# Patient Record
Sex: FEMALE | Race: WHITE
[De-identification: names, ages, dates, MRNs, and addresses within clinical notes are randomized per-mention and may not be internally consistent; named-entity substitution may affect disease eponyms.]

---

## 2020-01-10 ENCOUNTER — HOSPITAL ENCOUNTER (EMERGENCY)
Age: 55
Discharge: HOME | DRG: 72 | End: 2020-01-10
Payer: SELF-PAY

## 2020-01-10 DIAGNOSIS — Z79.4: ICD-10-CM

## 2020-01-10 DIAGNOSIS — E11.9: ICD-10-CM

## 2020-01-10 DIAGNOSIS — G93.9: Primary | ICD-10-CM

## 2020-01-10 DIAGNOSIS — I10: ICD-10-CM

## 2020-01-10 LAB
ALBUMIN SERPL-MCNC: 4.2 G/DL (ref 3.2–5)
ALP SERPL-CCNC: 112 U/L (ref 38–126)
ANION GAP SERPL CALCULATED.3IONS-SCNC: 18 MMOL/L
AST SERPL-CCNC: 19 U/L (ref 14–36)
BASOPHILS NFR BLD AUTO: 0 % (ref 0–3)
BUN SERPL-MCNC: 13 MG/DL (ref 7–17)
BUN/CREAT SERPL: 26
CHLORIDE SERPL-SCNC: 97 MMOL/L (ref 95–108)
CO2 SERPL-SCNC: 25 MMOL/L (ref 22–30)
CREAT SERPL-MCNC: 0.5 MG/DL (ref 0.5–1)
EOSINOPHIL NFR BLD AUTO: 1 % (ref 0–8)
ERYTHROCYTE [DISTWIDTH] IN BLOOD BY AUTOMATED COUNT: 12.4 % (ref 11.5–15.5)
HCT VFR BLD AUTO: 49.5 % (ref 37–47)
HGB BLD-MCNC: 16.4 G/DL (ref 12–16)
IMM GRANULOCYTES NFR BLD: 0.3 % (ref 0–5)
INR PPP: 0.9 RATIO (ref 0.7–1.3)
LYMPHOCYTES NFR BLD: 25 % (ref 15–41)
MCH RBC QN AUTO: 27.9 PG  CALC (ref 26–32)
MCHC RBC AUTO-ENTMCNC: 33.1 G/L CALC (ref 32–36)
MCV RBC AUTO: 84.2 FL  CALC (ref 80–100)
MONOCYTES NFR BLD AUTO: 5 % (ref 2–13)
NEUTROPHILS # BLD AUTO: 4.96 THOU/UL (ref 2–7.15)
NEUTROPHILS NFR BLD AUTO: 69 % (ref 42–76)
PLATELET # BLD AUTO: 341 THOU/UL (ref 130–400)
POTASSIUM SERPL-SCNC: 4.3 MMOL/L (ref 3.5–5.1)
PROT SERPL-MCNC: 8 G/DL (ref 6.3–8.2)
PROTHROMBIN TIME: 9.7 SECONDS (ref 9–12.5)
RBC # BLD AUTO: 5.88 MILL/UL (ref 4.2–5.6)
SODIUM SERPL-SCNC: 135 MMOL/L (ref 137–146)

## 2020-06-14 ENCOUNTER — HOSPITAL ENCOUNTER (EMERGENCY)
Dept: HOSPITAL 82 - ED | Age: 55
Discharge: NURSING, REHAB OR LONG-TERM CARE | End: 2020-06-14
Payer: MEDICAID

## 2020-06-14 VITALS — DIASTOLIC BLOOD PRESSURE: 92 MMHG | SYSTOLIC BLOOD PRESSURE: 175 MMHG

## 2020-06-14 VITALS — WEIGHT: 262.35 LBS | HEIGHT: 62 IN | BODY MASS INDEX: 48.28 KG/M2

## 2020-06-14 DIAGNOSIS — D49.6: ICD-10-CM

## 2020-06-14 DIAGNOSIS — Z11.59: ICD-10-CM

## 2020-06-14 DIAGNOSIS — R11.10: Primary | ICD-10-CM

## 2020-06-14 DIAGNOSIS — I10: ICD-10-CM

## 2020-06-14 DIAGNOSIS — E11.65: ICD-10-CM

## 2020-06-14 DIAGNOSIS — Z79.4: ICD-10-CM

## 2020-06-14 LAB
ALBUMIN SERPL-MCNC: 3.4 G/DL (ref 3.2–5)
ALP SERPL-CCNC: 89 U/L (ref 38–126)
AMYLASE SERPL-CCNC: 94 U/L (ref 30–110)
ANION GAP SERPL CALCULATED.3IONS-SCNC: 10 MMOL/L
AST SERPL-CCNC: 30 U/L (ref 14–36)
BASOPHILS NFR BLD AUTO: 0 % (ref 0–3)
BILIRUB UR QL STRIP.AUTO: NEGATIVE
BUN SERPL-MCNC: 22 MG/DL (ref 7–17)
BUN/CREAT SERPL: 48
CHLORIDE SERPL-SCNC: 97 MMOL/L (ref 95–108)
CO2 SERPL-SCNC: 31 MMOL/L (ref 22–30)
COLOR UR AUTO: YELLOW
CREAT SERPL-MCNC: 0.5 MG/DL (ref 0.5–1)
EOSINOPHIL NFR BLD AUTO: 0 % (ref 0–8)
ERYTHROCYTE [DISTWIDTH] IN BLOOD BY AUTOMATED COUNT: 12.8 % (ref 11.5–15.5)
GLUCOSE UR STRIP.AUTO-MCNC: >=1000 MG/DL
HCT VFR BLD AUTO: 41.8 % (ref 37–47)
HGB BLD-MCNC: 13.8 G/DL (ref 12–16)
HGB UR QL STRIP.AUTO: NEGATIVE
IMM GRANULOCYTES NFR BLD: 0.5 % (ref 0–5)
KETONES UR STRIP.AUTO-MCNC: 40 MG/DL
LEUKOCYTE ESTERASE UR QL STRIP.AUTO: NEGATIVE
LIPASE SERPL-CCNC: 973 U/L (ref 23–300)
LYMPHOCYTES NFR BLD: 8 % (ref 15–41)
MCH RBC QN AUTO: 28.7 PG  CALC (ref 26–32)
MCHC RBC AUTO-ENTMCNC: 33 G/DL CAL (ref 32–36)
MCV RBC AUTO: 86.9 FL  CALC (ref 80–100)
MONOCYTES NFR BLD AUTO: 5 % (ref 2–13)
MYOGLOBIN SERPL-MCNC: 44 NG/ML (ref 0–62)
NEUTROPHILS # BLD AUTO: 11.96 THOU/UL (ref 2–7.15)
NEUTROPHILS NFR BLD AUTO: 87 % (ref 42–76)
NITRITE UR QL STRIP.AUTO: NEGATIVE
PH UR STRIP.AUTO: 6 [PH] (ref 4.5–8)
PLATELET # BLD AUTO: 248 THOU/UL (ref 130–400)
POTASSIUM SERPL-SCNC: 3.5 MMOL/L (ref 3.5–5.1)
PROT SERPL-MCNC: 6.6 G/DL (ref 6.3–8.2)
PROT UR QL STRIP.AUTO: NEGATIVE MG/DL
RBC # BLD AUTO: 4.81 MILL/UL (ref 4.2–5.6)
SODIUM SERPL-SCNC: 134 MMOL/L (ref 137–146)
SP GR UR STRIP.AUTO: 1.02
UROBILINOGEN UR QL STRIP.AUTO: 2 E.U./DL

## 2020-06-25 ENCOUNTER — HOSPITAL ENCOUNTER (INPATIENT)
Dept: HOSPITAL 82 - ED | Age: 55
LOS: 14 days | Discharge: NURSING, REHAB OR LONG-TERM CARE | DRG: 55 | End: 2020-07-09
Attending: INTERNAL MEDICINE | Admitting: INTERNAL MEDICINE
Payer: MEDICAID

## 2020-06-25 VITALS — SYSTOLIC BLOOD PRESSURE: 97 MMHG | DIASTOLIC BLOOD PRESSURE: 62 MMHG

## 2020-06-25 VITALS — HEIGHT: 62 IN | WEIGHT: 249.12 LBS | BODY MASS INDEX: 45.84 KG/M2

## 2020-06-25 VITALS — DIASTOLIC BLOOD PRESSURE: 78 MMHG | SYSTOLIC BLOOD PRESSURE: 135 MMHG

## 2020-06-25 DIAGNOSIS — Z98.2: ICD-10-CM

## 2020-06-25 DIAGNOSIS — N39.0: ICD-10-CM

## 2020-06-25 DIAGNOSIS — Z23: ICD-10-CM

## 2020-06-25 DIAGNOSIS — R53.1: ICD-10-CM

## 2020-06-25 DIAGNOSIS — R29.810: ICD-10-CM

## 2020-06-25 DIAGNOSIS — F32.9: ICD-10-CM

## 2020-06-25 DIAGNOSIS — E11.65: ICD-10-CM

## 2020-06-25 DIAGNOSIS — C71.6: Primary | ICD-10-CM

## 2020-06-25 DIAGNOSIS — F41.9: ICD-10-CM

## 2020-06-25 DIAGNOSIS — I10: ICD-10-CM

## 2020-06-25 DIAGNOSIS — B96.20: ICD-10-CM

## 2020-06-25 DIAGNOSIS — Z79.4: ICD-10-CM

## 2020-06-25 DIAGNOSIS — Z20.828: ICD-10-CM

## 2020-06-25 DIAGNOSIS — R62.7: ICD-10-CM

## 2020-06-25 LAB
ALBUMIN SERPL-MCNC: 3.6 G/DL (ref 3.2–5)
ALP SERPL-CCNC: 80 U/L (ref 38–126)
ANION GAP SERPL CALCULATED.3IONS-SCNC: 9 MMOL/L
AST SERPL-CCNC: 14 U/L (ref 14–36)
BASOPHILS NFR BLD AUTO: 0 % (ref 0–3)
BUN SERPL-MCNC: 13 MG/DL (ref 7–17)
BUN/CREAT SERPL: 19
CHLORIDE SERPL-SCNC: 100 MMOL/L (ref 95–108)
CO2 SERPL-SCNC: 30 MMOL/L (ref 22–30)
CREAT SERPL-MCNC: 0.7 MG/DL (ref 0.5–1)
CRP SERPL-MCNC: 0.8 MG/DL (ref 0–0.9)
EOSINOPHIL NFR BLD AUTO: 1 % (ref 0–8)
ERYTHROCYTE [DISTWIDTH] IN BLOOD BY AUTOMATED COUNT: 13.2 % (ref 11.5–15.5)
HCT VFR BLD AUTO: 45.3 % (ref 37–47)
HGB BLD-MCNC: 14.7 G/DL (ref 12–16)
IMM GRANULOCYTES NFR BLD: 0.4 % (ref 0–5)
INR PPP: 1 RATIO (ref 0.7–1.3)
LYMPHOCYTES NFR BLD: 14 % (ref 15–41)
MAGNESIUM SERPL-MCNC: 1.9 MG/DL (ref 1.6–2.3)
MCH RBC QN AUTO: 28.2 PG  CALC (ref 26–32)
MCHC RBC AUTO-ENTMCNC: 32.5 G/DL CAL (ref 32–36)
MCV RBC AUTO: 86.8 FL  CALC (ref 80–100)
MONOCYTES NFR BLD AUTO: 6 % (ref 2–13)
NEUTROPHILS # BLD AUTO: 7.73 THOU/UL (ref 2–7.15)
NEUTROPHILS NFR BLD AUTO: 79 % (ref 42–76)
PLATELET # BLD AUTO: 311 THOU/UL (ref 130–400)
POTASSIUM SERPL-SCNC: 3.9 MMOL/L (ref 3.5–5.1)
PROT SERPL-MCNC: 6.6 G/DL (ref 6.3–8.2)
PROTHROMBIN TIME: 10.1 SECONDS (ref 9–12.5)
RBC # BLD AUTO: 5.22 MILL/UL (ref 4.2–5.6)
SODIUM SERPL-SCNC: 135 MMOL/L (ref 137–146)
TSH SERPL DL<=0.05 MIU/L-ACNC: 1.04 UIU/ML (ref 0.47–4.68)

## 2020-06-25 NOTE — NUR
PT STATES SHE HAS A HX OF DYSPHAGIA AND USES PUREED DIET AND HONEY THICKENED
LIQUIDS. VSS. RESP EVEN AND UNLABORED. CALLED PTS SON AT HER REQUEST TO INFORM
OF ADMIT AND REQUEST OF PT TO BRING HER READING GLASSES

## 2020-06-25 NOTE — NUR
PT ARRIVED TO [facility] VIA STRETCHER ACCOMPANIED BY ER NURSE, DINNER PROVIDED.
MECHANICAL SOFT AND THICKENED LIQUIDS, PT FED SELF TOLERATED WELL. PT HAS A
FLAT AFFECT, ALERT AND ORIENTED X3, NO EDEMA. ORIENTED PT TO ROOM AND CALL
LIGHT, DISCUSSED POC, PT STATES SHE HAD A BM, PT GIVEN AND PARTIAL BED BATH
AND NEW LINENS AND GOWN PROVIDED, NOTED MULTIPLE BRUISES TO BUE, PT STATES
FROM LAST HOSPITAL STAY. NOTED STAPLES TO R SIDE OF HEAD, PT HAS AV SHUNT.
STAPLES TO CHEST. L FACIAL DROOP. PT INCONTINENT, PUREWICK PLACED. NOTED SCAR
TO R FUNES, PHOTO OBTAINED PT STATES A SPIDER BITE FROM 2008, NO OPEN AREA.
NOTED REDNESS AND SCALING TO ELBOWS BILAT, PT STATES FROM PSORIASIS. ADMISSION
ASSESSMENT COMPLETED. CALL LIGHT IN REACH,CONTINUE TO MONITOR.

## 2020-06-25 NOTE — NUR
NURSE TO NURSE REPORT CALLED TO ZARINA ACUÑA [facility]. ZARINA LPN NOTIFIED PT
PUREED DIET WITH HONEY THICKENED LIQUIDS.

## 2020-06-25 NOTE — NUR
PT RESTING IN BED, STATES SHE DOES NOT WANT TO TAKE ANY PILLS AT THIS TIME.
CALL LIGHT IN REACH,CONTINUE TO MONITOR.

## 2020-06-25 NOTE — NUR
FULL BEDBATH GIVEN. PT INCONTINENT OF LOOSE YELLOW STOOLS AND URINE. PURE WICK
PLACED. VSS. PT IN NO DISTRESS

## 2020-06-25 NOTE — NUR
PT RESTING IN BED, VOICES NO COMPLAINTS OR NEEDS AT THIS TIME. PT ADVISED OF
WAIT TIME WHILE WAITING FOR ADMISSION. PT VERBALIZED UNDERSTANDING.

## 2020-06-25 NOTE — NUR
PT INCONTINENT OF URINE, MARK CARE AND LINEN CHANGE RENDERED. WICK WAS IN PLACE
BUT DID NOT CATCH URINE. VSS. PT UPDATED ON WAIT TIME FOR ROOM

## 2020-06-25 NOTE — NUR
PT TO ROOM VIA EMS STRETCHER , PT IN NO DISTRESS. VSS. PT NOTED TO HAVE LT ARM
ATAXIA, LT FACIAL DROOP, LT VISUAL FIELD DEFICIT. PT STATES THESE HAVE BEEN
PROGRESSIVE EFFECTS OF BRAIN TUMOR OVER LAST 4 MONTHS. PT IS ALERT AND ORIENTED
X4 AND HAS SLIGHTLY THICKENED SPEECH. PT HAS NO DRIFT OF UPPER OR LOWER
EXTREMITIES BUT STATES PROGRESSIVE 
WEAKNESS TO LT UPPER AND LOWER EXTREMITY FOR MONTHS

## 2020-06-26 VITALS — DIASTOLIC BLOOD PRESSURE: 75 MMHG | SYSTOLIC BLOOD PRESSURE: 134 MMHG

## 2020-06-26 VITALS — DIASTOLIC BLOOD PRESSURE: 76 MMHG | SYSTOLIC BLOOD PRESSURE: 128 MMHG

## 2020-06-26 VITALS — DIASTOLIC BLOOD PRESSURE: 88 MMHG | SYSTOLIC BLOOD PRESSURE: 148 MMHG

## 2020-06-26 VITALS — SYSTOLIC BLOOD PRESSURE: 122 MMHG | DIASTOLIC BLOOD PRESSURE: 68 MMHG

## 2020-06-26 VITALS — DIASTOLIC BLOOD PRESSURE: 89 MMHG | SYSTOLIC BLOOD PRESSURE: 154 MMHG

## 2020-06-26 VITALS — SYSTOLIC BLOOD PRESSURE: 124 MMHG | DIASTOLIC BLOOD PRESSURE: 72 MMHG

## 2020-06-26 LAB
BACTERIA #/AREA URNS HPF: (no result) HPF
BILIRUB UR QL STRIP.AUTO: NEGATIVE
COLOR UR AUTO: YELLOW
EPI CELLS URNS QL MICRO: (no result) EPI/HPF
GLUCOSE UR STRIP.AUTO-MCNC: 250 MG/DL
HGB UR QL STRIP.AUTO: (no result)
LEUKOCYTE ESTERASE UR QL STRIP.AUTO: (no result)
NITRITE UR QL STRIP.AUTO: POSITIVE
PH UR STRIP.AUTO: 7 [PH] (ref 4.5–8)
PROT UR QL STRIP.AUTO: (no result) MG/DL
RBC #/AREA URNS HPF: (no result) RBC/HPF (ref 0–5)
SP GR UR STRIP.AUTO: 1.02
UROBILINOGEN UR QL STRIP.AUTO: 1 E.U./DL
WBC #/AREA URNS HPF: (no result) WBC/HPF (ref 0–5)
YEAST URNS QL MICRO: (no result) HPF

## 2020-06-26 NOTE — NUR
VITALS OBTAINED, PT VOICES NO NEEDS OR COMPLAINTS AT THIS TIME, CALL LIGHT IN
REACH,CONTINUE TO MONITOR.

## 2020-06-26 NOTE — NUR
ASSESSMENT IS COMPLETED: IV SITE IS FREE FROM REDNESS OR EDEMA. HR IS
REG,PULSES ARE STRONG X4, ABD IS SOFT WITH ACTIVE BS. BREATH SOUNDS ARE CLEAR
BILATERALLY, NO C/O SOB. TELE MONITOR IN PLACE. CONTINUE TO OBSERVE AND
MONITOR.

## 2020-06-26 NOTE — NUR
PT RESTING IN BED, PT BOOSTED AND REPOSTIONED PER REQUEST. PT ALERT AND
ORIENTED. RESPIRATIONS EVEN AND UNLABORED, LUNGS SOUND CLEAR. PEDAL PULSES ARE
STRONG. PT DENIES ANY PAIN OR DISCOMFORT AT THIS TIME. SAFETY PRECAUTIONS IN
PLACE. WILL CONTINUE TO MONITOR.

## 2020-06-27 VITALS — DIASTOLIC BLOOD PRESSURE: 85 MMHG | SYSTOLIC BLOOD PRESSURE: 158 MMHG

## 2020-06-27 VITALS — SYSTOLIC BLOOD PRESSURE: 148 MMHG | DIASTOLIC BLOOD PRESSURE: 68 MMHG

## 2020-06-27 VITALS — SYSTOLIC BLOOD PRESSURE: 172 MMHG | DIASTOLIC BLOOD PRESSURE: 86 MMHG

## 2020-06-27 VITALS — SYSTOLIC BLOOD PRESSURE: 146 MMHG | DIASTOLIC BLOOD PRESSURE: 57 MMHG

## 2020-06-27 VITALS — DIASTOLIC BLOOD PRESSURE: 89 MMHG | SYSTOLIC BLOOD PRESSURE: 167 MMHG

## 2020-06-27 PROCEDURE — 3E0234Z INTRODUCTION OF SERUM, TOXOID AND VACCINE INTO MUSCLE, PERCUTANEOUS APPROACH: ICD-10-PCS | Performed by: INTERNAL MEDICINE

## 2020-06-27 NOTE — NUR
PT RESTING IN BED EATING LUNCH, NO SIGNS OF DISTRESS NOTED, RESP EVEN AND
UNLABORED. PT ALERT AND ORIENTED X3, DISCUSSED POC, PT VOICES NO NEEDS OR
COMPLAINTS AT THIS TIME, PT HAS STAPLES TO R HEAD FROM AV SHUNT PLACED IN
MIRNA, STAPLES TO CHEST INTACT, ASSESSMENT COMPLETED, CALL LIGHT IN
REACH,CONTINUE TO MONITOR.

## 2020-06-27 NOTE — NUR
PT RESTING IN BED, NO SIGNS OF DISTRESS NOTED, RESP EVEN AND UNLABORED. PT
ALERT AND ORIENTED X3, DISCUSSED POC, PT IS INCONTINENT OF BOWEL AND BLADDER.
OFFERED PUREWICK PT DECLINED. ASSESSMENT COMPLETED, CALL LIGHT IN
REACH,CONTINUE TO MONITOR.

## 2020-06-27 NOTE — NUR
PT RESTING IN BED, NO SIGNS OF DISTRESS NOTED, RESP EVEN AND UNLABORED. VOICES
NO NEEDS OR COMPLAINTS AT THIS TIME. CALL LIGHT IN REACH,CONTINUE TO MONITOR.

## 2020-06-27 NOTE — NUR
PT RESTING IN BED ALERT AND ORIENTED. RESPIRATIONS EVEN AND UNLABORED ON RA.
LUNGS SOUND DIMINISHED. PEDAL PULSES ARE STRONG. PT REPOSITIONS PER REQUEST,
BARRIOR CREME ALPIED TO BOTTOM. SAFETY PRECAUTIONS IN PLACE. WILL CONTINUE TO
MONITOR.

## 2020-06-28 VITALS — DIASTOLIC BLOOD PRESSURE: 78 MMHG | SYSTOLIC BLOOD PRESSURE: 157 MMHG

## 2020-06-28 VITALS — DIASTOLIC BLOOD PRESSURE: 85 MMHG | SYSTOLIC BLOOD PRESSURE: 155 MMHG

## 2020-06-28 VITALS — SYSTOLIC BLOOD PRESSURE: 141 MMHG | DIASTOLIC BLOOD PRESSURE: 80 MMHG

## 2020-06-28 VITALS — DIASTOLIC BLOOD PRESSURE: 85 MMHG | SYSTOLIC BLOOD PRESSURE: 157 MMHG

## 2020-06-28 VITALS — SYSTOLIC BLOOD PRESSURE: 170 MMHG | DIASTOLIC BLOOD PRESSURE: 80 MMHG

## 2020-06-28 VITALS — DIASTOLIC BLOOD PRESSURE: 79 MMHG | SYSTOLIC BLOOD PRESSURE: 134 MMHG

## 2020-06-28 LAB
ALBUMIN SERPL-MCNC: 3.1 G/DL (ref 3.2–5)
ALP SERPL-CCNC: 67 U/L (ref 38–126)
ANION GAP SERPL CALCULATED.3IONS-SCNC: 10 MMOL/L
AST SERPL-CCNC: 13 U/L (ref 14–36)
BASOPHILS NFR BLD AUTO: 0 % (ref 0–3)
BUN SERPL-MCNC: 14 MG/DL (ref 7–17)
BUN/CREAT SERPL: 28
CHLORIDE SERPL-SCNC: 106 MMOL/L (ref 95–108)
CO2 SERPL-SCNC: 23 MMOL/L (ref 22–30)
CREAT SERPL-MCNC: 0.5 MG/DL (ref 0.5–1)
EOSINOPHIL NFR BLD AUTO: 0 % (ref 0–8)
ERYTHROCYTE [DISTWIDTH] IN BLOOD BY AUTOMATED COUNT: 13 % (ref 11.5–15.5)
HCT VFR BLD AUTO: 40.6 % (ref 37–47)
HGB BLD-MCNC: 13.2 G/DL (ref 12–16)
IMM GRANULOCYTES NFR BLD: 0.5 % (ref 0–5)
LYMPHOCYTES NFR BLD: 13 % (ref 15–41)
MCH RBC QN AUTO: 28 PG  CALC (ref 26–32)
MCHC RBC AUTO-ENTMCNC: 32.5 G/DL CAL (ref 32–36)
MCV RBC AUTO: 86.2 FL  CALC (ref 80–100)
MONOCYTES NFR BLD AUTO: 4 % (ref 2–13)
NEUTROPHILS # BLD AUTO: 7.86 THOU/UL (ref 2–7.15)
NEUTROPHILS NFR BLD AUTO: 82 % (ref 42–76)
PLATELET # BLD AUTO: 262 THOU/UL (ref 130–400)
POTASSIUM SERPL-SCNC: 4.3 MMOL/L (ref 3.5–5.1)
PROT SERPL-MCNC: 6 G/DL (ref 6.3–8.2)
RBC # BLD AUTO: 4.71 MILL/UL (ref 4.2–5.6)
SODIUM SERPL-SCNC: 135 MMOL/L (ref 137–146)

## 2020-06-28 NOTE — NUR
PT RESTING IN BED, NO SIGNS OF DISTRESS NOTED, RESP EVEN AND UNLABORED, PT
ALERT AND ORIENTED X3, DISCUSSED POC, PT HAS A FLAT AFFECT, VOICES NO NEEDS OR
COMPLAINTS AT THIS TIME. ASSESSMENT COMPLETED, CALL LIGHT IN REACH,CONTINUE TO
MONITOR.

## 2020-06-28 NOTE — NUR
SPOKE TO YANIV FROM [facility] REGARDING PT, TRANSFERRED CALL TO PT. CALL LIGHT
IN REACH,CONTINUE TO MONITOR.

## 2020-06-28 NOTE — NUR
PT RESTING IN BED, NO SIGNS OF DISTRESS NOTED, RESP EVEN AND UNLABORED, PT
VOICES NO NEEDS OR COMPLAINTS AT THIS TIME, CONTINUE TO MONITOR.

## 2020-06-28 NOTE — NUR
PT RESTING IN BED, REQUESTING PUDDING, NO SIGNS OF DISTRESS NOTED, RESP EVEN
AND UNLABORED. CALL LIGHT IN REACH,CONTINUE TO MONITOR.

## 2020-06-28 NOTE — NUR
PT RESTING IN BED ALERT AND ORIENTED. RESPIRATIONS EVEN AND UNLABORED ON RA,
LUNGS SOUND DIMINISHED. PEDAL PULSES STROONG. SAFETY PRECAUTIONS IN PLACE.
WILL CONTINUE TO MONITOR.

## 2020-06-28 NOTE — NUR
PT BP ELEVATED INFORMED MD, ORDERS TO START NEW MEDICATION DAILY AND START
NOW. DISCUSSED WITH PT, VERBALIZED UNDERSTANDING. CALL LIGHT IN REACH,CONTINUE
TO MONITOR.

## 2020-06-28 NOTE — NUR
PT RESTING IN BED. NO S/S OF DISTRESS AT THIS TIME. RESPIRATIONS EVEN AND
UNLABORED. SAFETY PRECAUTIONS IN PLACE. WILL CONTINUE TO MONITOR.

## 2020-06-29 VITALS — DIASTOLIC BLOOD PRESSURE: 80 MMHG | SYSTOLIC BLOOD PRESSURE: 150 MMHG

## 2020-06-29 VITALS — DIASTOLIC BLOOD PRESSURE: 85 MMHG | SYSTOLIC BLOOD PRESSURE: 157 MMHG

## 2020-06-29 VITALS — SYSTOLIC BLOOD PRESSURE: 91 MMHG | DIASTOLIC BLOOD PRESSURE: 63 MMHG

## 2020-06-29 VITALS — SYSTOLIC BLOOD PRESSURE: 168 MMHG | DIASTOLIC BLOOD PRESSURE: 85 MMHG

## 2020-06-29 VITALS — DIASTOLIC BLOOD PRESSURE: 76 MMHG | SYSTOLIC BLOOD PRESSURE: 150 MMHG

## 2020-06-29 VITALS — SYSTOLIC BLOOD PRESSURE: 161 MMHG | DIASTOLIC BLOOD PRESSURE: 78 MMHG

## 2020-06-29 NOTE — NUR
PT INCONTINENT OF URINE, PT CLEANED UP, AND SOILED LINENS CHANGED. SAFETY
PRECAUTIONS IN PLACE. WILL CONTINUE TO MONITOR.

## 2020-06-29 NOTE — NUR
RECEIVED REPORT FROM NURSE DENG PATIENT RESTING IN BED, BREATHING EVEN AND
UNLABORED, CALL LIGHT AT REACH.

## 2020-06-29 NOTE — NUR
RECIEVED REPORT FROM ELISA WILLIAM. PT RESTING IN SEMI FOWLERS POSITION UPON
ENTERING ROOM. INTRODUCED SELF TO PT AND DISCUSSED POC. RESPIRATIONS ARE EVEN
AND UNLABORED WITH NO SIGNS OF DRISTRESS. PT DENIES ANY PAIN OR DISCOMFORTS AT
THIS TIME. ALL SAFTEY PRECAUTIONS IN PLACE WITH CALLL LIGHT IN REACH .WILL
COTNINUE TO MONITOR

## 2020-06-29 NOTE — NUR
ASSESSMENT AND VITALS COMPLETED AT THIS TIME. /76, HR 63, O2 97% ON ROOM
AIR. RESPIRATIONS ARE EVEN AND UNLABORED WITH NO SIGNS OF DISTRESS. LUNG
SOUNDS ARE CLEAR. HEART RHYTHM IS NORMAL, TELE IN PLACE. BOWEL SOUNDS ARE HYPO
ACTIVE. LAST REPORTED BM 06/25/2020. MIRLAX ADMINISTERED WITH MORNING
MEDICATIONS. RADIAL AND PEDAL PULSES ARE STRONG WITH NORMAL CAPILLARY REFILL.
SKIN IS WARM AND DRY, PRESENT WITH MULTIPLE SMALL BRUISES. PT STATES ITS FROM
"BEING POKED WITH THE NEEDLES." IV FLUIDS RUNNING  ML AS ORDERED. SITE
APPEARS HEALTHY AND PATENT.PT DENIES ANY PAIN OR DISCOMFORTS AT THIS TIME. ALL
SAFTEY PRECAUTIONS IN PLACE WITH CALL LIGHT IN REACH. WILL CONTINUE TO
MONITOR.

## 2020-06-29 NOTE — NUR
PT RESTING IN SEMI FOWLERS POSITON WATCHING TV. RESPIRATIONS ARE EVEN AND
UNLABORED WITH NO SIGNS OF DISTRESS.PT DENIES ANY PAIN OR ADDITIONAL NEEDS AT
THIS TIME.  ALL SAFETY PRECAUTIONS IN PLACE WITH CALL LIGHT IN REACH. WILL
CONITNUE TO MONITOR

## 2020-06-29 NOTE — NUR
PATIENT ALERT ORINETED ABLE TO MAKE NEEDS KNONW, C/O OF PAIN OCCIPITAL PART OF
THE HEAD, WILL MEDICATE.

## 2020-06-29 NOTE — NUR
PT RESTING IN BED, RESPIRATIONS EVEN AND UNLABORED ON RA. TELE IN PLACE. NO
S/S OF DISTRESS AT THIS TIME. SAFTY PRECAUTIONS IN PLACE. WILL CONTINUE TO
MONITOR.

## 2020-06-29 NOTE — NUR
PT RESTING IN SEMI FOWELERS POSITION WATCHING TV. RESPIRATIONS ARE EVEN AND
UNLABORED WITH NO SIGNS OF DISTRESS. PT DENIES ANY PAIN OR DISCOMFORTS AT THIS
TIME WITH CALL LIGTH IN REACH. WILL CONTINUE TO MONITOR

## 2020-06-30 VITALS — SYSTOLIC BLOOD PRESSURE: 173 MMHG | DIASTOLIC BLOOD PRESSURE: 85 MMHG

## 2020-06-30 VITALS — SYSTOLIC BLOOD PRESSURE: 158 MMHG | DIASTOLIC BLOOD PRESSURE: 87 MMHG

## 2020-06-30 VITALS — SYSTOLIC BLOOD PRESSURE: 156 MMHG | DIASTOLIC BLOOD PRESSURE: 85 MMHG

## 2020-06-30 VITALS — DIASTOLIC BLOOD PRESSURE: 84 MMHG | SYSTOLIC BLOOD PRESSURE: 165 MMHG

## 2020-06-30 VITALS — DIASTOLIC BLOOD PRESSURE: 83 MMHG | SYSTOLIC BLOOD PRESSURE: 141 MMHG

## 2020-06-30 VITALS — DIASTOLIC BLOOD PRESSURE: 85 MMHG | SYSTOLIC BLOOD PRESSURE: 166 MMHG

## 2020-06-30 VITALS — DIASTOLIC BLOOD PRESSURE: 84 MMHG | SYSTOLIC BLOOD PRESSURE: 150 MMHG

## 2020-06-30 VITALS — SYSTOLIC BLOOD PRESSURE: 139 MMHG | DIASTOLIC BLOOD PRESSURE: 81 MMHG

## 2020-06-30 VITALS — DIASTOLIC BLOOD PRESSURE: 79 MMHG | SYSTOLIC BLOOD PRESSURE: 146 MMHG

## 2020-06-30 NOTE — NUR
RECEIVED REPORT FROM NURSE DENG PATIENT RESTING IN BEED WATCHING TV,
BREATHING EVEN AND UNLABORED CALL LIGHT AT REACH.

## 2020-06-30 NOTE — NUR
PT note
Entered pt. room as she was in semi-fowlers position.  Pt. had agreed to
participate in physical therapy. Began sesssion w/ heel slides, ankle pumps
for improved blood flow.  Supine>sit (independent), sit>stand (Min A), VC for
correct hand placement and for proper form to ascend to a standing position.
Staic standing w/ light weight shifting ant/post, laterally.  Pt stated
dizziness was not to bad as she was standing.  Stand>sit (Independent),
sit>supine (Mod A) w/ pt. LE.  bed mobility as pt scooted towards head of bed
(Min A).  Pt. in semi-fowlers position w/ tray table and call bell by bedside.
 [facility] 6 score 14

## 2020-06-30 NOTE — NUR
PT RESTING IN SEMI FOWLERS POSITION WACTHING TV. RESPIRATIONS ARE EVEN AND
UNLABORED. PT DENIES ANY PAIN OR DISCOMFORTS. ALL SAFTEY PRECAUTIONS IN PLACE
WITH CALL LIGHT IN REACH. WILL CONTINUE TO MONITOR

## 2020-06-30 NOTE — NUR
RECIEVED REPORT FROM ELISA SILVA. PT RESTING IN SEMI FOWLERS POSITION UPON
ENTERING ROOM. INTRODUCED SELF TO PT AND DISCUSSED POC.RESPIRATIONS ARE EVEN
AND UNLABORED WITH NO SIGNS OF DISTRESS. PT DENIES ANY PAIN OR DISCOMFORTS AT
THIS TIME. ALL SAFETY PRECAUTIONS IN PLACE WITH ALL LIGHT IN REACH. WILL
CONTINUE TO MONTIOR

## 2020-06-30 NOTE — NUR
PATIENT ALERT ORIENTED ABLE TO MAKE NEEDS KNOWN, WITH ONGOING IV NS @ 100CC/HR
INFUSING WELL ON RT WRIST, REMAINS ON TELE SR 68, LBM 6/30, PATIENT
REPOSITIONED FOR COMFORT, BREATHING  EVEN AND UNLABORED CALL LIGHT AT REACH.

## 2020-06-30 NOTE — NUR
PT RESTING IN SEMI FOWLERS POSITION WATCHING TV. RESPIRATIONS ARE EVEN AND
UNLABORED WITH NO DISTRESS.PT DENIES ANY PAIN OR DISCOMFORTS AT THIS TIME. ALL
SAFTEY PRECAUTIONS REMAIN IN PLACE WITH CALL LIGHT IN REACH. WILL CONTINUE TO
MONITOR

## 2020-06-30 NOTE — NUR
SUUPOSITORY ADMINISTERED AT THIS TIME. PT TOLERATED WELL. REASSESSMENT OF BP
RESULTING /79, HR 70, O2 95% ON ROOM AIR. ADDITIONAL DOSE OF 5MG NORVASC
ORDERED AND ADMINISTERED. PT DENIES ANY PAIN OR ADDITIONAL NEEDS AT THIS TIME.
ALL SAFTEY PRECAUTIONS REAMIN IN PLACE WITH CALL LIGHT IN REACH. WILL CONTINUE
TO MONITOR

## 2020-06-30 NOTE — NUR
ASSESSMENT AND VIATLS COMPLETED AT THIS TIME. REASSESSMENT OF BP RESULTING IN
165/84, HR 62, O2 95% ON ROOM AIR. RESPIRATIONS ARE EVEN AND UNLABORED. LUNG
SOUNDS ARE CLEAR. HEART RHYTHM IS NORMAL WITH TELE IN PLACE.BOWEL SOUNDS ARE
ACTIVE IN ALL QUADRANTS, LAST REPORTED BM 06/26/20. PT REFUSED MO LAST NIGHT.
NOTIFED JEANMARIE OF LAST BM, SUPPOSITORY TO BE ADMINISTERED.  RADIAL AND PEDAL
PULSES ARE STRONG WITH NORMAL CAPILLARY REFILL. PT DOES PRESENT WITH SLIGHT
LEFT SIDE WEAKNESS. SKIN IS WARM AND DRY WITH NO BREAKDOWN. IV FLUIDS RUNNING
 ML AS ORDERED. SITE APPEARS HEALTHY AND PATENT. PT BACK OF HEAD IS
SHAVED WITH 5 STAPLES. PT STATES ITS FROM PREVIOUSLY WHEN SHE HAD TO GET IT
DRAINED. PT ALSO PRESENTS WITH STAPLES ON CHEST FROM WHAT PT STATES TO BE FROM
PREVIOUS SHUNT. PT DENIES ANY PAIN OR DISCOMFORTS AT THIS TIME. ALL SAFTEY
PRECAUTIONS IN PLACE WITH CALL LIGHT IN REACH. WILL COTNINUE TO MONITOR

## 2020-07-01 VITALS — SYSTOLIC BLOOD PRESSURE: 134 MMHG | DIASTOLIC BLOOD PRESSURE: 80 MMHG

## 2020-07-01 VITALS — SYSTOLIC BLOOD PRESSURE: 157 MMHG | DIASTOLIC BLOOD PRESSURE: 82 MMHG

## 2020-07-01 VITALS — SYSTOLIC BLOOD PRESSURE: 130 MMHG | DIASTOLIC BLOOD PRESSURE: 85 MMHG

## 2020-07-01 VITALS — DIASTOLIC BLOOD PRESSURE: 89 MMHG | SYSTOLIC BLOOD PRESSURE: 150 MMHG

## 2020-07-01 VITALS — DIASTOLIC BLOOD PRESSURE: 83 MMHG | SYSTOLIC BLOOD PRESSURE: 154 MMHG

## 2020-07-01 VITALS — DIASTOLIC BLOOD PRESSURE: 74 MMHG | SYSTOLIC BLOOD PRESSURE: 126 MMHG

## 2020-07-01 NOTE — NUR
RECIEVED REPORT FROM ELISA SILVA. PT RESTING IN LOW FOWLERS POSITION WATCHING
TV. PT REQUESTED TO BE PULLED UP IN BED, ASSISTED BY HERSON HAMMOND. RESPIRATIONS
ARE EVEN AND UNLABORED WITH NO SIGNS OF DISTRESS. PT DENIES ANY PAIN OR
DISCOMFORTS. ALL SAFETY PRECAUTIONS IN PLACE WITH CALL LIGHT IN REACH.WILL
CONTINUE TO MONITOR.

## 2020-07-01 NOTE — NUR
PT WAS SEEN FOR THERA. DYNAMIC ACTIVITIES. SHE WAS SUPINE IN BED, AGREED TO
WORK WITH P.T. SUPINE TO SIT WITH CGA. PT REPORTS SUDDEN ONSET SEVERE
DIZZINESS AS SHE SAT UP. SHE WAS GIVEN ADEQUATE TIME TO STAY IN SITTING
POSITION UNTIL DIZZINESS SUBSIDES. SHE THEN STOOD UP WITH MIN A AND WALKER,
AGAIN REPORTED DIZZINESS, WAITED UNTIL DIZZINESS SUBSIDED. PT TOOK 3 STEPS AND
A PIVOT TURN WITH RW AND MOD A TO TRANSFER AND SIT IN THE BEDSIDE RECLINER.
SHE WAS GIVEN VERBAL CUES TO SIT IN THE RECLINER, LEG REST ELEVATED, CALL BELL
AND PHONE BESIDE HER. PT REPORTED FEELING BETTER BUT STILL DIZZY. SHE WAS
INSTRUCTED AND PERFORMED ANKLE ROM EX AND LAQ ISOMETRICS. LEFT PT W/O ADVERSE
RXNS NOTED OR REPORTED AT THE END OF TX.
AMPAC: 11 POINTS

## 2020-07-01 NOTE — NUR
REPORT FROM SOWMYA ACUÑA. PT NOTED SITTING UP IN BED. ALERT AND ORIENTED. NO
APAPRENT DISTRESS NOTED. IV SITE APPEARS HEALTHY WITH IVF INFUSING. PT DENIES
ANY PAIN OR DISCOMFORT. PURWIK IN PLACE. DISCUSSED POC. PT VERBALIZED
UNDERSTANDING.  NO CURRENT WANTS OR NEEDS. CALL LIGHT WITHIN REACH. WILL
CONTINUE TO MONITOR.

## 2020-07-01 NOTE — NUR
PT RESTING IN BED WITH EYES CLOSED. NO APPARENT DISTRESS NOTED. VSS. PURWIK IN
PLACE. CALL LIGHT WITHIN REACH. WILL CONTINUE TO MONITOR.

## 2020-07-01 NOTE — NUR
ASSESSMENT AND VITALS COMPLETED AT THIS TIME. PT IS A/O X3 AND INCONTINENT .
/89, HR 72, O2 97% ON ROOM AIR. RESPIRATIONS ARE EVEN AND UNLABORED WITH
NO SIGNS OF DISTRSS. LUNG SOUNDS ARE CLEAR. HEART RHYTHM IS NORMAL WITH TELE
IN PLACE.  BOWEL SOUNDS ARE ACTIVE IN ALL QUADRANTS WITH NO TENDERNESS. LAST
REPORTED BM 06/30/20. RADIAL AND PEDAL PULSES ARE STRONG WITH NORMAL CAPILLARY
REFILL. IV #22 IN RW RUNNING @100 ML NS AS ORDERED, SITE APPEARS HEALTHY AND
PATENT. SKIN IS WARM AND DRY. PT APPEARS TO BE SLIGHTLY FLSUHED IN THE FACE,
AIR TURNED DOWN AND PT REQUESTED A FAN.  SKIN APPEARS TO HAVE MUTIPLE SMALL
BRUISES. PT PRESENT WITH LARGE BRUISED AREA ON RIGHT CALF, PT STATES ITS FROM
"A SPIDER BITE" PRIOR TO COMING TO [facility]. PT PRESENTS WITH STABLES IN HEAD (5
RIGHT SIDE, 18 LEFT SIDE) CHEST(2) AND RIGHT UPPER ABD(14) FROM WHAT PT STATES
IS FROM "SHUNT AT [employer]". MORNING MEDS ADMINISTERED WITH NO DIFFICULTY. PT
DENIES ANY PAINS OR NEEDS AT THIS TIME. ALL SAFTEY PRECAUTIONS IN PLACE WITH
CALL LIGHT IN REACH. WILL CONTINUE TO MONITOR

## 2020-07-01 NOTE — NUR
PT SITTING IN RECYLINER WATCHING TV. RESPIRATIONS ARE EVEN AND UNLABORED WITH
NO SIGNS OF DISTRESS. PT DENIES ANY PAIN OR DISCOMFORTS AT THIS TIME. ALL
SAFETY PRECAUTIONS REMAIN IN PLACE WITH CALL LIGHT IN REACH. WILL CONTINUE TO
MONITOR

## 2020-07-01 NOTE — NUR
PT REQUESTED TO BE ASSISTED BACK INTO BED AT THIS TIME. PT ASSISTED BACK TO
BED WITH BOTH ANA AND HERSON HAMMOND. RESPIRATIONS ARE EVEN AND UNLABORED WITH
NO SIGNS OF DISTRESS. PT REQUESTED FOR FAN TO BE TURNED ON. PT DENIES ANY PAIN
OR NEEDS AT THIS ITME. ALL SAFTEY PRECAUTIONS IN PLACE WITH CALL LIGHT IN
REACH. WILL CONTINUE TO MONITOR

## 2020-07-01 NOTE — NUR
INCONTINENT CARE PROVIDE AT THIS TIME, PATIENT REPOSITIONED, CURRENLTY RESTING
IN BED, NOT IN DISTRESS CALL LIGHT AT REACH.

## 2020-07-02 VITALS — DIASTOLIC BLOOD PRESSURE: 96 MMHG | SYSTOLIC BLOOD PRESSURE: 155 MMHG

## 2020-07-02 VITALS — DIASTOLIC BLOOD PRESSURE: 73 MMHG | SYSTOLIC BLOOD PRESSURE: 132 MMHG

## 2020-07-02 VITALS — DIASTOLIC BLOOD PRESSURE: 87 MMHG | SYSTOLIC BLOOD PRESSURE: 137 MMHG

## 2020-07-02 VITALS — SYSTOLIC BLOOD PRESSURE: 140 MMHG | DIASTOLIC BLOOD PRESSURE: 81 MMHG

## 2020-07-02 VITALS — DIASTOLIC BLOOD PRESSURE: 69 MMHG | SYSTOLIC BLOOD PRESSURE: 117 MMHG

## 2020-07-02 VITALS — DIASTOLIC BLOOD PRESSURE: 95 MMHG | SYSTOLIC BLOOD PRESSURE: 148 MMHG

## 2020-07-02 NOTE — NUR
REPORT FROM YANIV ACUÑA. PT NOTED SITTING UP IN BED. ALERT AND ORIENTED. NO
APAPRENT DISTRESS NOTED. IV SITE APPEARS HEALTHY WITH IVF INFUSING. PT DENIES
ANY PAIN OR DISCOMFORT. DISCUSSED POC. PT VERBALIZED UNDERSTANDING.  NO
CURRENT WANTS OR NEEDS. CALL LIGHT WITHIN REACH. WILL CONTINUE TO MONITOR.

## 2020-07-02 NOTE — NUR
Clinician attempted to treat patient however she refused stating she had not
been feeling well and would not participate. Pt verbalized wanting to see
nurse, clinician informed nurse and pt was left with nurse.

## 2020-07-02 NOTE — NUR
ASSISTED PT WITH REPOSITIONING. MEDICATED FOR PAIN AND SLEEP UPON REQUEST. NO
OTHER WANTS OR NEEDS. CALL LIGHT WITHIN REACH. WILL CONTINUE TO MONITOR.

## 2020-07-02 NOTE — NUR
PT REPORTS TO BE FEELING BETTER AT THIS TIME. INSTRUCTIONS TO TURN AND MOVE
SLOWLY WERE GIVEN. PT VERBALIZED UNDERSTANDING. CALL LIGHT IN REACH. CONTINUE
TO MONITOR.

## 2020-07-02 NOTE — NUR
PT SITTING IN BED. A&O X3. PT C/O OF SEVERE DIZZINESS AND NAUSEA. EXPLAINED TO
PT THAT PHYSICIAN WOULD BE MADE AWARE. NO OTHER NEEDS AT THIS TIME. ASSESSMENT
COMPLETED. DISCUSSED POC. CALL LIGHT IN REACH. CONTINUE TO MONITOR.

## 2020-07-02 NOTE — NUR
Upon entering room patient had a vomit receptacle on her bed. She reported she
was feeling dizzy and nauseous and wont be able to participate today.

## 2020-07-02 NOTE — NUR
PT C/O DIZZINESS, PT BECAME NAUSEATED AND START TO VOMIT. ON CALL PHYSICIAN
NOTIFIED. NEW ORDERS RECEIVED. WILL MEDICATED ONCE PROFILED BY PHARMACY. CLEAN
GOWN APPLIED AND PT CLEANED UP.

## 2020-07-03 VITALS — DIASTOLIC BLOOD PRESSURE: 71 MMHG | SYSTOLIC BLOOD PRESSURE: 140 MMHG

## 2020-07-03 VITALS — DIASTOLIC BLOOD PRESSURE: 92 MMHG | SYSTOLIC BLOOD PRESSURE: 150 MMHG

## 2020-07-03 VITALS — DIASTOLIC BLOOD PRESSURE: 59 MMHG | SYSTOLIC BLOOD PRESSURE: 138 MMHG

## 2020-07-03 VITALS — DIASTOLIC BLOOD PRESSURE: 78 MMHG | SYSTOLIC BLOOD PRESSURE: 135 MMHG

## 2020-07-03 VITALS — DIASTOLIC BLOOD PRESSURE: 96 MMHG | SYSTOLIC BLOOD PRESSURE: 155 MMHG

## 2020-07-03 VITALS — DIASTOLIC BLOOD PRESSURE: 84 MMHG | SYSTOLIC BLOOD PRESSURE: 136 MMHG

## 2020-07-03 LAB
BILIRUB UR QL STRIP.AUTO: NEGATIVE
COLOR UR AUTO: YELLOW
GLUCOSE UR STRIP.AUTO-MCNC: >=1000 MG/DL
HGB UR QL STRIP.AUTO: NEGATIVE
KETONES UR STRIP.AUTO-MCNC: NEGATIVE MG/DL
LEUKOCYTE ESTERASE UR QL STRIP.AUTO: NEGATIVE
NITRITE UR QL STRIP.AUTO: NEGATIVE
PH UR STRIP.AUTO: 5 [PH] (ref 4.5–8)
PROT UR QL STRIP.AUTO: NEGATIVE MG/DL
SP GR UR STRIP.AUTO: 1.02
UROBILINOGEN UR QL STRIP.AUTO: 0.2 E.U./DL

## 2020-07-03 NOTE — NUR
ASSESSMENT DONE. TELE IN PLACE. PT IS A&O X3. PT DENIES PAIN AT THIS TIME. PT
STATED SHE FEEL LESS DIZZY AT THIS TIME. RESPS EVEN AND UNLABORED. IVF
INFUSING WELL. SAFETY PRECAUTIONS REINFORCED AND CALL LIGHT IN REACH.

## 2020-07-03 NOTE — NUR
STAPLES IN PLACE IN THE BACK OF PT HEAD. PT STATED PAIN IN HEAD AND FEELS
DIZZY. MEDICATED PT WITH TYLENOL AND ANTIVERT. PT SON IN ROOM. PT DENIES ANY
OTHER NEEDS AT THIS TIME. CALL LIGHT IN REACH. yes

## 2020-07-03 NOTE — NUR
REPORT FROM DIANELYS PÉREZ. PT NOTED SITTING UP IN BED. ALERT AND ORIENTED. NO
APAPRENT DISTRESS NOTED. IV SITE APPEARS HEALTHY WITH IVF INFUSING. DISCUSSED
POC. PT VERBALIZED UNDERSTANDING. NO CURRENT WANTS OR NEEDS. CALL LIGHT
WITHIN REACH. WILL CONTINUE TO MONITOR.

## 2020-07-03 NOTE — NUR
Pt. found resting in bed this AM, she refused to get out of bed however in
agreement to participate in bed therapeutic exercises. Pt. instructed on and
performed long sitting R/LLE AROM ankle pumps, heel slides, hip abductions,
hip IR/ER, SAQ exercises and AAROM SLR's x 10 repetitions each exercise.
Clinician provided intermititent verbal+tactile cues during ex. for proper
form and breathing technique.
Post ex. pt. reported feeling great. She was left resting comfortably in bed
without questions/concerns. AMPAC score unchanged. Attending nurse informed of
pt.'s participation with physical therapy.

## 2020-07-03 NOTE — NUR
PT RESTING IN BED WITH EYES CLOSED. NO APPARENT DISTRESS NOTED. RESPIRATIONS
EVEN AND UNLABORED. PT WAKES EASILY. VSS. PT C/O DIZZINESS, WILL MEDICATE AS
ORDERED. CALL LIGHT WITHIN REACH WILL CONTINUE TO MONITOR.

## 2020-07-03 NOTE — NUR
COMPLETE BED BATH AND LINEN CHANGE. PT INCONTINENT OF URINE. MEDICATED FOR
PAIN IN BACK OF HEAD. BED ADJUSTED SO PT COULD REPOSITION SELF. NO APPARENT
DISTRESS NOTED. CALL LIGHT WITHIN REACH. WILL CONTINUE TO MONITOR.

## 2020-07-04 VITALS — SYSTOLIC BLOOD PRESSURE: 144 MMHG | DIASTOLIC BLOOD PRESSURE: 88 MMHG

## 2020-07-04 VITALS — DIASTOLIC BLOOD PRESSURE: 76 MMHG | SYSTOLIC BLOOD PRESSURE: 124 MMHG

## 2020-07-04 VITALS — SYSTOLIC BLOOD PRESSURE: 134 MMHG | DIASTOLIC BLOOD PRESSURE: 57 MMHG

## 2020-07-04 VITALS — DIASTOLIC BLOOD PRESSURE: 58 MMHG | SYSTOLIC BLOOD PRESSURE: 130 MMHG

## 2020-07-04 VITALS — DIASTOLIC BLOOD PRESSURE: 68 MMHG | SYSTOLIC BLOOD PRESSURE: 113 MMHG

## 2020-07-04 NOTE — NUR
PT YELLING STATED SHE IS DIZZY. MEDICATED PT WITH ANTIVERT. PT DENIES ANY
OTHER NEEDS AT THIS TIME AND CALL LIGHT IN REACH.

## 2020-07-04 NOTE — NUR
PT INCONTINENT OF LARGE AMOUNTS OF URINE. COMPLETE LINEN CHANGE AND PERICARE
PROVIDED. PT REPOSITIONED SELF IN BED WITH ASSIST. CALL LIGHT WITHIN REACH.
WILL CONTINUE TO MONITOR.

## 2020-07-04 NOTE — NUR
REPORT FROM DIANELYS PÉREZ. NOTED RESTING IN BED. NO APPARENT DISTRESS. ALERT AND
ORIENTED. PT DENIES ANY PAIN. C/O DIZZINESS, WILL MEDICATE AS ORDERED.
DISCUSSED POC. PT VERBALIZED UNDERSTANDING. IV SITE APPEARS HEALTHY, IVF KVO.
NO CURRENT WANTS OR NEEDS. CALL LIGHT WITHIN REACH. WILL CONTINUE TO MONITOR.

## 2020-07-04 NOTE — NUR
ASSISTED PT WITH REPOSITIONING IN BED. NO APPARENT DISTRESS NOTED. PT DENIES
ANY PAIN OR DIZZINESS AT THIS TIME. CALL LIGHT WITHIN REACH. WILL CONTINUE TO
MONITOR.

## 2020-07-04 NOTE — NUR
PT MEDICATED AS ORDERED. SNACK PROVIDED. PT C/O HEAD PAIN AND REQUESTS
SLEEPING PILL AT THIS TIME. MEDICATED FOR BOTH. PT DENIES ANY OTHER WANTS OR
NEEDS. CALL LIGHT WITHIN REACH. WILL CONTINUE TO MONITOR.

## 2020-07-05 VITALS — DIASTOLIC BLOOD PRESSURE: 83 MMHG | SYSTOLIC BLOOD PRESSURE: 134 MMHG

## 2020-07-05 VITALS — DIASTOLIC BLOOD PRESSURE: 81 MMHG | SYSTOLIC BLOOD PRESSURE: 133 MMHG

## 2020-07-05 VITALS — SYSTOLIC BLOOD PRESSURE: 137 MMHG | DIASTOLIC BLOOD PRESSURE: 85 MMHG

## 2020-07-05 VITALS — SYSTOLIC BLOOD PRESSURE: 135 MMHG | DIASTOLIC BLOOD PRESSURE: 87 MMHG

## 2020-07-05 NOTE — NUR
ASSESSMENT DONE. PT IS A&O X3. PT DENIES PAIN AT THIS TIME. PT STATED SHE FEEL
LITTLE DIZZY BUT DENIES MEDICATION. RESPS EVEN AND UNLABORED. IVF INFUSING
WELL. PT DENIES ANY NEEDS AT THIS TIME. CALL LIGHT IN REACH.

## 2020-07-05 NOTE — NUR
PT IS EATING HER LUNCH WITH NO S/S OF DISTRESS NOTED. FAMILY MEMBER IN ROOM.
PT DENIES DIZZINESS OR PAIN. CALL LIGHT IN REACH.

## 2020-07-05 NOTE — NUR
PT RESTING IN BED. NO APPARENT DISTRESS NOTED. RESPIRATIONS EVEN AND
UNLABORED. CALL LIGHT WITHIN REACH. WILL CONTINUE TO MONITOR.

## 2020-07-05 NOTE — NUR
PT STATED THAT THE MEDICATION HELPED WITH HER DIZZINES. PO FLUIDS PROVIDED. PT
DENIES ANY OTHER NEEDS AT THIS TIME. CALL LIGHT IN REACH.

## 2020-07-05 NOTE — NUR
UPON ENTERING ROOM PT FOUND TO BE RESTING IN BED. APPEARS COMFORTABLE AND IN
NO APPARENT DISTRESS. RESPIRATIONS ARE REGULAR AND UNLABORED. PHYSICAL
ASSESMENT COMPLETE AT THIS TIME. SCHEDULED MED(S) ADMINISTERED, SEE E-MAR.
PLAN OF CARE REVIEWED, PT DENIES QUESTIONS, VERBALIZES UNDERSTANDING.
DENIES NEEDS AT THIS TIME. ITEMS WITHIN REACH, BED LOCKED IN LOW POSITION W/
BEDRAILS UP X2. CALL BELL WITHIN REACH, AGREES TO CALL PRN.

## 2020-07-05 NOTE — NUR
PT RESTING IN BED WITH EYES CLOSED. NO APPARENT DISTRESS NOTED. RESPIRATIONS
EVEN AND UNLABORED. CALL LIGHT WITHIN REACH. WILL CONTINUE TO MONITOR.

## 2020-07-06 VITALS — DIASTOLIC BLOOD PRESSURE: 80 MMHG | SYSTOLIC BLOOD PRESSURE: 134 MMHG

## 2020-07-06 VITALS — SYSTOLIC BLOOD PRESSURE: 128 MMHG | DIASTOLIC BLOOD PRESSURE: 67 MMHG

## 2020-07-06 VITALS — SYSTOLIC BLOOD PRESSURE: 110 MMHG | DIASTOLIC BLOOD PRESSURE: 70 MMHG

## 2020-07-06 VITALS — DIASTOLIC BLOOD PRESSURE: 89 MMHG | SYSTOLIC BLOOD PRESSURE: 142 MMHG

## 2020-07-06 NOTE — NUR
PT RESTING IN BED, ALERT AND ORIENTED. RESPIRATIONS EVEN AND UNLABORED, LUNGS
SOUND CLEAR. PEDAL PULSES STRONG. PT REPORTS HAVING MILD PAIN AND COMPLAINS OF
BEING WET. PT TO BE MEDICATED PER EMAR. PT CLEANED UP AND SOILED LINENS
CHANGED. SAFETY PRECAUTIONSIN PLACE. WILL CONTINUE TO MONITOR.

## 2020-07-06 NOTE — NUR
PT APPEARS TO BE SLEEPING COMFORTABLY, NO APPARENT DISTRESS, RESPIRATIONS
REGULAR AND UNLABORED. CALL GRUBER REMAINS WITHIN REACH.

## 2020-07-06 NOTE — NUR
Pt was instructed to sit edge of bed required VC's for breathing techniques.
Clinician instructed pt to perform oral hygiene edge of bed however pt
refused. Pt was instructed in UB exercises including shoulder flexion,
shoulder abduction and elbow flexion 2x10 each requiring short rest breaks
after each set. Pt was able to stand 2 times for 3 seconds requiring Min A
and Tactiles cues.
Pt maintained 02
levels above 95%.

## 2020-07-06 NOTE — NUR
PT OOB RESTING IN RECLINER;RESPIRATIONS EVEN AND UNLABORED ON RA;PT DENIES ANY
CURRENT PAIN OR NEEDS;IV SITE PATENT;ACCUCHECK 388, PT COVERED WITH SLIDING
SCALE NOVOLOG PER ORDER;PT DENIES ANY ADDITIONAL NEEDS AT THIS TIME AND IS
ENCOURAGED TO CALL FOR ASSISTANCE IF NEEDED;CALL LIGHT IN REACH;WILL CONTINUE
TO MONITOR

## 2020-07-06 NOTE — NUR
PHYSICAL ASSESMENT UNCHANGED FROM BEGINING OF SHIFT BASELINE. PT AFEBRILE,
HEMODYNAMICS STABLE. DENIES NEEDS AT THIS TIME. ITEMS REMAIN WITHIN REACH. BED
REMAINS LOCKED IN LOW POSITION W/ BEDRAILS UPX2. CALL GRUBER REMAINS WITHIN
REACH, AGREES TO CALL PRN.

## 2020-07-06 NOTE — NUR
REPORT RECEIVED FROM ELISA CHARLES;PT APPEARS TO BE SLEEPING IN SUPINE
POSITION;NO S/S OF DISTRESS NOTED;RESPIRATIONS EVEN AND UNLABORED ON RA;ALL
SAFETY PRECAUTIONS IN PLACE WITH BED IN THE LOWEST POSITION AND CALL LIGHT IN
REACH;WILL CONTINUE TO MONITOR

## 2020-07-06 NOTE — NUR
PT RESTING IN SEMI FOWLERS POSITION,A&O X3;VS OBTAINED AND ASSESSMENT
COMPLETED;PT DENIES ANY CURRENT PAIN OR DISCOMFORTS,PAIN SCALE AND REPORTING
EDUCATED;RESPIRATIONS EVEN AND UNLABORED RA;ABDOMEN DISTENDED/SOFT ON
PALPATION AND ACTIVE IN ALL 4 QUADRANTS;WEAK PEDAL PULSES;REDDENING NOTED TO
MARK AREA R/T INCONTINECE,PT REFUSES THE USE OF A PUREWICK CATHETER;5 STAPLES
NOTED TO HEAD AND 14 TO RUQ R/T SHUNT;#22G TO RFA INFUSING NS @ [facility] PER
ORDER;ACCUCHECK 282, PT COVERED WITH SLIDING SCALE NOVOLOG PER ORDER;PT DENIES
ANY ADDITIONAL NEEDS AND IS ENCOURAGED TO CALL FOR ASSISTANCE IF NEEDED;CALL
LIGHT IN REACH;WILL CONTINUE TO MONITOR

## 2020-07-06 NOTE — NUR
PT note
Pt. was in semi-fowlers position upon entering, patient agreed to participate
in therapy session.  Semi-fowlers position > sit(independent), scooting
towards bedside (MIN A).  Static sitting balance is well, breathing exercises
as pt. static sits.  O2 levels remained above 95%.  Sit>stand (MIN A), VC for
correct hand placement as she ascends to a standing position. Ambulated from
bed side to reclining chair approximately 10 steps, pt. was fearful of falling
but assured her she would be ok.(MOD A).  Stand>sit (MIN A) as she decsends to
a seated position tactile cues for proper hand placement.  Pt remained in
chair as exitied room w/ tray table and call bell by pt. side.  Nurse was
informed of pt seated in chair.

## 2020-07-06 NOTE — NUR
PT APPEARS TO BE APPEARS TO BE SLEEPING IM SEMI FOWLERS POSITION;RESPIRATIONS
EVEN AND UNLABORED ON RA;NO S/S OF DISTRESS NOTED;IV FLUIDS INFUSING WITH
EASE TO RFA;ALL SAFETY PRECAUTIONS REMAIN IN PLACE WITH BED IN THE LOWEST
POSITION AND CALL LIGHT IN REACH;WILL CONTINUE TO MONITOR

## 2020-07-07 VITALS — SYSTOLIC BLOOD PRESSURE: 128 MMHG | DIASTOLIC BLOOD PRESSURE: 80 MMHG

## 2020-07-07 VITALS — SYSTOLIC BLOOD PRESSURE: 143 MMHG | DIASTOLIC BLOOD PRESSURE: 83 MMHG

## 2020-07-07 VITALS — SYSTOLIC BLOOD PRESSURE: 141 MMHG | DIASTOLIC BLOOD PRESSURE: 62 MMHG

## 2020-07-07 VITALS — SYSTOLIC BLOOD PRESSURE: 106 MMHG | DIASTOLIC BLOOD PRESSURE: 72 MMHG

## 2020-07-07 NOTE — NUR
PT Note
Entered PT. room as she was in semi-fowlers position.  Miss Woodward stated she
would participate in therapy session, but did not want to get out of bed.  Pt
executed ther exercises consisting of heel slides x 20, ankle pumps x 50, LE
horizontal abduction x 15, SAQ x 15, quad contractions x 10 w/ 4 sec holds.
Pt. LLE noticeably weaker.  Pt was in semi-fowlers position as exited room w/
tray table and call bell by pt. side.

## 2020-07-07 NOTE — NUR
PT RESTING IN SEMI FOWLERS POSITION;RESPIRATIONS EVEN AND UNLABORED ON RA;PT
DENIES ANY CURRENT PAIN OR DISCOMFORTS;IV FLUIDS INFUSING WITH EASE TO
RFA;ASSESSMENT REMAINS UNCHANGED AT THIS TIME;PT ENCOURAGED TO CALL FOR
ASSISTANCE IF NEEDED;FALL PRECAUTIONS IN PLACE WITH CALL LIGHT IN REACH;WILL
CONTINUE TO MONITOR

## 2020-07-07 NOTE — NUR
PT RESTING IN BED, ALERT AND ORIENTED. RESPIRATIONS EVEN AND UNLABORED ON RA.
LUNGS SOUND CLEAR. PEDAL PULSES STRONG. PT REPORTS HAVING MILD PAIN AT THE
BACK OF HER HEAD, PT TO BE MEDICATED PER EMAR ORDERS. PT HAD TWO STAPPLES IN
HER CHEST, TO BE REMOVED, PT REFUSED AT THIS TIME STATING SHE WOULD RATHER
HAVE THEM REMOVED IN THE MORNING, EDUCATED PT ON THE RISK FOR INFESCTION BY
LEAVING THE STAPPLES IN ANY LONGER, PT CONTINUE TO REFUSE TO HAVE THEM REMOVED
AT THIS TIME. SAFETY PRECAUTIONS IN PLACE. WILL CONTINUE TO MONITOR.

## 2020-07-07 NOTE — NUR
REPORT RECEIVED FROM ELISA WILLIAM;PT APPEARS TO BE SLEEPING IN SEMI FOWLERS
POSITION;NO S/S OF DISTRESS NOTED;RESPIRATIONS EVEN AND UNLABORED ON RA;IV
FLUIDS INFUSING WITH EASE PER ORDER;ALL SAFETY PRECAUTIONS NOTED WITH BED IN
THE LOWEST POSITION AND CALL LIGHT IN REACH;WILL CONTINUE TO MONITOR

## 2020-07-07 NOTE — NUR
PT RESTING IN SEMI FOWLERS POSITION,A&O X3;VS OBTAINED AND ASSESSMENT
COMPLETED;PT DENIES ANY CURRENT PAIN OR DISCOMFORTS,PAIN SCALE AND REPORTING
EDUCATED;RESPIRATIONS EVEN AND UNLABORED ON RA,CLEAR/DIMINISHED LUNG SOUNDS
NOTED;ABDOMEN DISTENDED/SOFT ON PALPATION AND ACTIVE IN ALL 4 QUADRANTS, MOM
PROVIDED PER REQUEST TO ASSIST IN BOWEL CARE;REDDENING NOTED TO MARK AREA, PT
REFUSES PUREWICK TO PREVENT SKIN BREAKDOWN;WEAK PEDAL PULSES;SKIN INTACT;14
STAPLES NOTED TO ABDOMEN R/T "SHUNT" AND 5 TO BACK OF HEAD,MERRY;#22G TO LEFT
WRIST INFUSING NS @ [facility] PER ORDER,SITE APPEARS HEALTY;ACCCUCHECK 339, PT
COVERED WITH SLIDING SCALE NOVOLOG PER ORDER;PT DENIES ANY ADDITIONAL NEEDS AT
THIS TIME AND IS ENCOURAGED TO CALL FOR ASSISTANCE IF NEEDED;FALL PRECAUTIONS
IN PLACE WITH BED IN THE LOWEST POSITION AND CALL LIGHT IN REACH;WILL CONTINUE
TO MONITOR

## 2020-07-07 NOTE — NUR
PT RESTING IN SEMI FOWLERS POSITION WITH SON AT BEDSIDE;RESPIRATIONS EVEN AND
UNLABORED ON RA;PT DENIES ANY CURRENT PAIN OR NEEDS;IV SITE REMAINS PATENT
INFUSING NS PER ORDER;ACCUCHECK , PT WAS COVERED WITH SLIDING SCALE
NOVOLOG PER ORDER;PT ENCOURAGED TO CALL FOR ASSISTANCE IF NEEDED;CALL LIGHT IN
REACH;WILL CONTINUE TO MONITOR

## 2020-07-07 NOTE — NUR
14 STAPLES REMOVED FROM ABDOMEN AND 5 STAPLES REMOVED FROM BACK OF HEAD, PT
TOLERATED WELL.WILL CONTINUE TO MONITOR

## 2020-07-07 NOTE — NUR
Entered room as pt was in semi fowlers position and was instructed in
performing oral hygiene. Pt agreed and clinician set table up, pt was able to
uncrew tooth paste cap and put on toothbrush, pt was able to brush her teeth
and rinse her mouth, pt wiped herself when done requiring VC's to complete. Pt
was instructed in performing UB exercises including shoulder flexion, elbow
flexion, horizontal abduction, and finger flexion. Pt performed 2x10 each with
short rest breaks in between. Pt was motivated and in a great mood. Call bell
and tray table left at pt's side.
 
[facility] 6 score
14

## 2020-07-07 NOTE — NUR
PT RESTING IN BED. RESPIRATIONS EVEN AND UNLABORED ON RA. PT ASKING FOR AN
ADDITIONAL SLEEPING PILL, REMINDED PT SHE HAD A SLEEPING PILL. PT BECAME
AGGITATED AND BEGAN TO YELL "I KNOW I HAD A SLEEPING PILL, SOMETIMES THEY GIVE
TWO!! WELL WHAT ARE WE GOING TO DO ABOUT ME NOT BEING ABLE TO SLEEP!" EDUCATED
PT ON SLEEPINF MEDICATION FREQUENCY. PT REFUSED COMFORT MEASURES. SAFETY
PRECAUTIONS IN PLACE. WILL CONTINUE TO MONITOR.

## 2020-07-08 VITALS — SYSTOLIC BLOOD PRESSURE: 139 MMHG | DIASTOLIC BLOOD PRESSURE: 79 MMHG

## 2020-07-08 VITALS — DIASTOLIC BLOOD PRESSURE: 85 MMHG | SYSTOLIC BLOOD PRESSURE: 144 MMHG

## 2020-07-08 VITALS — DIASTOLIC BLOOD PRESSURE: 81 MMHG | SYSTOLIC BLOOD PRESSURE: 141 MMHG

## 2020-07-08 VITALS — DIASTOLIC BLOOD PRESSURE: 81 MMHG | SYSTOLIC BLOOD PRESSURE: 133 MMHG

## 2020-07-08 NOTE — NUR
UPON ENTERING ROOM PT APPEARS TO BE SLEEPING. APPEARS COMFORTABLE AND IN
NO APPARENT DISTRESS. RESPIRATIONS ARE REGULAR AND UNLABORED. ITEMS REMAIN
WITHIN REACH, BED REMAINS LOCKED IN LOW POSITION W/ BEDRAILS UP X2 AND CALL
GRUBER REMAINS WITHIN REACH.

## 2020-07-08 NOTE — NUR
PT RESTING IN SEMI FOWLERS POSITION,A&O X3;VS OBTAINED AND ASSESSMENT
COMPLETED;PT DENIES ANY CURRENT PAIN OR DISCOMFORTS,PAIN SCALE AND REPORTING
EDUCATED;RESPIRATIONS EVEN AND UNLABORED ON RA,CLEAR/DIMINISHED LUNG SOUNDS
NOTED;ABDOMEN DISTENDED/SOFT ON PALPATION AND ACTIVE IN ALL 4
QUADRANTS;REDDENING NOTED TO MARK AREA DUE TO INCONTINENCE, PT REFUSES
PUREWICK CATHETER TO HELP PREVENT BREAKDOWN;WEAK PEDAL PULSES;#22G TO RIGHT
FOREARM INFUSING NS @ [facility] PER ORDER,SITE APPEARS HEALTHT;ACCUCHECK 297, PT
COVERED WITH SLIDING SCALE NOVOLOG PER ORDER;PT DENIES ANY ADDITIONAL NEEDS AT
THIS TIME;ENCOURAGED TO CALL FOR ASSISTANCE IF NEEDED;CALL LIGHT IN REACH;WILL
CONTINUE TO MONITOR

## 2020-07-08 NOTE — NUR
PT WAS SEEN FOR PHYSICAL THERAPY AND WAS COOPERATIVE WITH P.T.
ACTIVITIES TODAY. THEREX WAS DONE WHICH INCLUDED ACTIVE ROM EX ON B UE AND LE
X 10 REPS, ALL PLANES X TACTILE CUES FROM THERAPIST. BED MOB INCLUDING TURNING
ON BOTH SIDES FROM SUPINE X MIN A WAS ALSO DONE. PT REPORTED DIZZINESS WITH
CHANGES IN POSITION WHICH RESOLVED QUICKLY. DENIES NAUSEA DURING THE TX.
STRESSED THE IMPORTANCE OF CHANGING POSITIONS TO PREVENT PRESSURE SORES.
[facility] SCORE TODAY: 10 POINTS

## 2020-07-08 NOTE — NUR
REPORT RECEIVED FROM ELISA WILLIAM;PT APPEARS TO BE SLEEPING IN SEMI FOWLERS
POSITION;RESPIRATIONS EVEN AND UNLABORED ON RA;NO S/S OF DISTRESS NOTED;IV
FLUIDS INFUSING WITH EASE PER ORDER;ALL SAFETY PRECAUTIONS IN PLACE WITH BED
IN THE LOWEST POSITION AND CALL LIGHT IN REACH;WILL CONTINUE TO MONITOR

## 2020-07-08 NOTE — NUR
PT RESTING IN BED, RESPIRATIONS EVEN AND UNLABORED ON RA. NO S/S OF DISTRESS
AT THIS TIME. SAFETY PRECAUTIONS IN PLACE. WILL CONTINUE TO MONITOR.

## 2020-07-08 NOTE — NUR
UPON ENTERING ROOM PT FOUND TO BE RESTING IN BED. APPEARS COMFORTABLE AND IN
NO APPARENT DISTRESS. RESPIRATIONS ARE REGULAR AND UNLABORED. PHYSICAL
ASSESMENT COMPLETE AT THIS TIME. SCHEDULED MED(S) ADMINISTERED, SEE E-MAR.
PRN COMPAZINE ADMINISTERED FOR C/O DIZZINESS, PRN SONATA FOR SLEEP, AND PRN
PERCOCET FOR HEADACHE 6/10 ADMINISTERED. SEE MAR. BEDSIDE GLUCOSE CHECK 352,
S.S. NOVOLOG AND SCHEDULED NOVOLIN 70/30 ADMINISTERED. SEE MAR. DIABETIC HS
SNACK PROVIDED, PT ASSISTED W/ SET UP. PLAN OF CARE REVIEWED, PT DENIES
QUESTIONS, VERBALIZES UNDERSTANDING. DENIES FURTHER NEEDS AT THIS TIME. ITEMS
WITHIN REACH, BED LOCKED IN LOW POSITION W/ BEDRAILS UP X2. CALL BELL WITHIN
REACH, AGREES TO CALL PRN.

## 2020-07-08 NOTE — NUR
PT RESTING IN SEMI FOWLERS POSITION EATING LUNCH WITH SPEECH THERAPY AT
BEDSIDE;RESPIRATIONS EVEN AND UNLABORED ON RA;PT DENIES ANY CURRENT PAIN OR
NEEDS;IV FLUIDS CONTINUE TO INFUSE WITH EASE TO RFA;ACCUCHECK 311, PT COVERED
WITH SLIDING SCALE NOVOLOG PER ORDER;PT DENIES ANY ADDITIONAL NEEDS AND IS
ENCOURAGED TO CALL FOR ASSISTANCE AT THIS TIME;CALL LIGHT IN REACH;WILL
CONTINUE TO MONITOR

## 2020-07-08 NOTE — NUR
Entered pt's room while pt was sitting up in the bed. Clinician set pt up to
perform oral hygiene. Pt was able to uncap toothpaste and spread the
toothpaste onto toothbrush with setup. Pt was able to complete oral care. Pt
was able to comb her hair lighty. Pt was instructed in performing UB exercises
including shoulder flexion, shoulder abduction, elbow flexion, wrist
extension, and finger flexion. Pt was able to complete 2x10 of each with VC's.
Pt was instructed in performing finger oposition with L hand, pt required R
hand to assist. Pt had a positive attitude and is motivated.
[facility] 6 score
14
Pt left remote and call bell at bedside within reach.

## 2020-07-08 NOTE — NUR
PT RESTING IN SEMI FOWLERS POSITION;RESPIRATIONS EVEN AND UNLABORED ON RA;PT
DENIES ANY CURRENT PAIN OR NEEDS;IV SITE PATENT INFUSING NS WITH EASE PER
ORDER;PT ENCOURAGED TO CALL FOR ASSISTANCE IF NEEDED;FALL PRECAUTIONS IN PLACE
WITH CALL LIGHT IN REACH;WILL CONTINUE TO MONITOR

## 2020-07-09 VITALS — DIASTOLIC BLOOD PRESSURE: 83 MMHG | SYSTOLIC BLOOD PRESSURE: 142 MMHG

## 2020-07-09 VITALS — SYSTOLIC BLOOD PRESSURE: 118 MMHG | DIASTOLIC BLOOD PRESSURE: 72 MMHG

## 2020-07-09 VITALS — DIASTOLIC BLOOD PRESSURE: 90 MMHG | SYSTOLIC BLOOD PRESSURE: 141 MMHG

## 2020-07-09 NOTE — NUR
[facility] TRANSPORTING PT [facility] AT THIS TIME IN
STABLE CONDITION VIA STRETCHER. ALL BELONGINGS AND DISCHARGE INSTRUCTIONS LEFT
WITH PT

## 2020-07-09 NOTE — NUR
PT WAS SEEN RESTING IN BED IN HOOKLYING POSITION. PROME AND STRETCHING WERE
DONE ON B LE WHICH PT APPRECIATED AND WAS THANKFUL FOR. SHE STATES SHE FELT
BETTER AFTERWARDS. PROCEEDED TO BED MOB ACTIVITIES WHICH INCLUDES SLIDING,
SCOOTING AND ROLLING ON B SIDES WITH MIN A FROM THERAPIST. PT DENIED DIZZINESS
WITH CHANGES IN POSITION, UNLIKE YESTERDAY. SHE WAS INSTRUCTED ON AROME AND
LEG ISOMETRICS TO BE DONE IN BED. PT UNDERSTOOD AND AGREED.
[facility]: 10 POINTS

## 2020-07-09 NOTE — NUR
PT RESTING IN SEMI FOWLERS POSITION TALKING TO SON AT BEDSIDE. RESPIRATIONS
ARE EVEN AND UNLABORED WITH NO SIGNS OF DISTRESS. PT DENIES ANY PAIN OR
DISCOMFORTS AT THIS TIME.ALL SAFTEY PRECAUTIONS IN PLACE WITH CALL LIGHT IN
REACH.WILL CONTINUE TO MONITOR.

## 2020-07-09 NOTE — NUR
PT RESTING IN SEMI FOWLERS POSITION WATCHING TV. RESPIRATIONS ARE EVEN AND
UNALBORED WITH NO SIGNS OF DISTRESS. PT DENIES ANY PAINS OR DSICOMFORTS AT
THIS TIME. PT INFORMED ON TRANSFER TO [facility]. PT VERBALIZED
UNDERSTANDING. AWAITING DISCHARGE ORDERS AT THIS TIME. ALL SAFETY PRECAUTIONS
REAMIN IN PLACE WITH CALL  LIGHT IN REACH.

## 2020-07-09 NOTE — NUR
RECIEVED REPORT FROM ELISA CHARLES. PT RESTING IN SEMI FOWLERS POSITION
WATCHING TV. RESPIRATIONS ARE EVEN AND UNLABORED WITH NO SIGNS OF DISTRESS. PT
DENIES ANY PAIN OR DIS COMFORTS AT THIS TIME. ALL SAFETY PRECAUTIONS IN PLACE
WITH CALL LIGHT IN REACH. WILL CONTINUE TO MONITOR.

## 2020-07-09 NOTE — NUR
Pt seen sitting up propped up in the bed and was instructed to perform oral
hygiene. Pt was able to uncap toothpaste and spread it on toothbrush. Pt was
able to brush her teeth and rince her mouth requiring setup in bed. Pt was
instructed in performing UB exercises for shoulders, elbows, wrist and
fingers. Pt was able to perform shoulder flexion, elbow flexion, wrist
flexion/extension and finger flexion. Pt was able to complete 2x10 each with
VC's. Clinician added a new exercise using a rolled up wash cloth, pt was able
to hold it using L hand and squizing x10 with 3 seconds hold. Pt had a
positive attitude and was motivated.
[facility] 6 score
14

## 2020-07-09 NOTE — NUR
PT APPEARS TO BE SLEEPING COMFORTABLY IN BED. NO APPARENT DISTRESS.
RESPIRATIONS REGULAR AND UNLABORED.PHYSICAL ASSESMENT UNCHANGED FROM BEGINING
OF SHIFT BASELINE. PT AFEBRILE, HEMODYNAMICS STABLE. DENIES NEEDS AT THIS
TIME. ITEMS REMAIN WITHIN REACH. BED REMAINS LOCKED IN LOW POSITION W/
BEDRAILS UPX2. CALL BELL REMAINS WITHIN REACH, AGREES TO CALL PRN.

## 2020-07-09 NOTE — NUR
ASSESSMENT AND VITALS COMPLETED AT THIS TIME. PT IS A/OX3 AND MAX ASSIST. BP
141/90, HR 69, O2 95% ON ROOM AIR. RESPIRATIONS ARE EVEN AND UNLABORED WITH NO
SIGNS OF DISTRESS. LUNG SOUNDS ARE CLEAR. HEART RHYTHM IS NORMAL. BOWEL SOUNDS
ARE ACTIVE IN ALL QUADRANTS, LAST REPORTED BM 07/08/20. RADIAL AND PEDAL
PULSES ARE STRONG WITH NORMAL CAPILLARY REFILL. SKIN IS WARM AND DRY WITH NO
EDEMA OR BREAK DOWN. #22G IN RFA RUNNING WITH FLUIDS AT 20 AS ORDERED, SITE
APPEARS HEALTHY AND PATENT. PT DENIES ANY PAIN OR DISCOMFORTS AT THIS TIME.
ALL SAFEY PRECAUTIONS IN PLACE WITH CALL LIGHT IN REACH. WILL CONTINUE TO
MONITOR

## 2020-07-09 NOTE — NUR
CNA REPORTS FINGERSTICK GLUCOSE RESULTS "CRITICAL HIGH", STAT SERUM GLUCOSE
CHECK ORDERED, JADE PHLEBOTOMIST CALLED AND ADVISED.

## 2021-01-21 ENCOUNTER — HOSPITAL ENCOUNTER (OUTPATIENT)
Dept: HOSPITAL 82 - ED | Age: 56
Setting detail: OBSERVATION
LOS: 3 days | Discharge: HOME HEALTH | End: 2021-01-24
Attending: INTERNAL MEDICINE | Admitting: INTERNAL MEDICINE
Payer: COMMERCIAL

## 2021-01-21 VITALS — BODY MASS INDEX: 43.43 KG/M2 | WEIGHT: 236 LBS | HEIGHT: 62 IN

## 2021-01-21 DIAGNOSIS — Z79.4: ICD-10-CM

## 2021-01-21 DIAGNOSIS — Z74.01: ICD-10-CM

## 2021-01-21 DIAGNOSIS — Z23: ICD-10-CM

## 2021-01-21 DIAGNOSIS — C71.9: ICD-10-CM

## 2021-01-21 DIAGNOSIS — Z96.89: ICD-10-CM

## 2021-01-21 DIAGNOSIS — I10: ICD-10-CM

## 2021-01-21 DIAGNOSIS — N39.0: ICD-10-CM

## 2021-01-21 DIAGNOSIS — Z20.822: ICD-10-CM

## 2021-01-21 DIAGNOSIS — R53.81: ICD-10-CM

## 2021-01-21 DIAGNOSIS — F41.9: ICD-10-CM

## 2021-01-21 DIAGNOSIS — K59.00: Primary | ICD-10-CM

## 2021-01-21 DIAGNOSIS — E11.65: ICD-10-CM

## 2021-01-21 DIAGNOSIS — E66.01: ICD-10-CM

## 2021-01-21 LAB
ALBUMIN SERPL-MCNC: 4.2 G/DL (ref 3.2–5)
ALP SERPL-CCNC: 121 U/L (ref 38–126)
AMYLASE SERPL-CCNC: 38 U/L (ref 30–110)
ANION GAP SERPL CALCULATED.3IONS-SCNC: 15 MMOL/L
AST SERPL-CCNC: 24 U/L (ref 14–36)
BASOPHILS NFR BLD AUTO: 0 % (ref 0–3)
BILIRUB UR QL STRIP.AUTO: NEGATIVE
BUN SERPL-MCNC: 19 MG/DL (ref 7–17)
BUN/CREAT SERPL: 37
CHLORIDE SERPL-SCNC: 96 MMOL/L (ref 95–108)
CO2 SERPL-SCNC: 26 MMOL/L (ref 22–30)
COLOR UR AUTO: YELLOW
CREAT SERPL-MCNC: 0.5 MG/DL (ref 0.5–1)
EOSINOPHIL NFR BLD AUTO: 0 % (ref 0–8)
ERYTHROCYTE [DISTWIDTH] IN BLOOD BY AUTOMATED COUNT: 14.4 % (ref 11.5–15.5)
GLUCOSE UR STRIP.AUTO-MCNC: >=1000 MG/DL
HCT VFR BLD AUTO: 47.9 % (ref 37–47)
HGB BLD-MCNC: 15.8 G/DL (ref 12–16)
HGB UR QL STRIP.AUTO: NEGATIVE
IMM GRANULOCYTES NFR BLD: 0.5 % (ref 0–5)
KETONES UR STRIP.AUTO-MCNC: 40 MG/DL
LEUKOCYTE ESTERASE UR QL STRIP.AUTO: NEGATIVE
LYMPHOCYTES NFR BLD: 12 % (ref 15–41)
MCH RBC QN AUTO: 28.3 PG  CALC (ref 26–32)
MCHC RBC AUTO-ENTMCNC: 33 G/DL CAL (ref 32–36)
MCV RBC AUTO: 85.8 FL  CALC (ref 80–100)
MONOCYTES NFR BLD AUTO: 3 % (ref 2–13)
NEUTROPHILS # BLD AUTO: 8.75 THOU/UL (ref 2–7.15)
NEUTROPHILS NFR BLD AUTO: 84 % (ref 42–76)
NITRITE UR QL STRIP.AUTO: NEGATIVE
PH UR STRIP.AUTO: 5 [PH] (ref 4.5–8)
PLATELET # BLD AUTO: 388 THOU/UL (ref 130–400)
POTASSIUM SERPL-SCNC: 3.8 MMOL/L (ref 3.5–5.1)
PROT SERPL-MCNC: 7.8 G/DL (ref 6.3–8.2)
PROT UR QL STRIP.AUTO: NEGATIVE MG/DL
RBC # BLD AUTO: 5.58 MILL/UL (ref 4.2–5.6)
SODIUM SERPL-SCNC: 134 MMOL/L (ref 137–146)
SP GR UR STRIP.AUTO: 1.01
UROBILINOGEN UR QL STRIP.AUTO: 0.2 E.U./DL

## 2021-01-21 PROCEDURE — G0378 HOSPITAL OBSERVATION PER HR: HCPCS

## 2021-01-22 VITALS — SYSTOLIC BLOOD PRESSURE: 138 MMHG | DIASTOLIC BLOOD PRESSURE: 73 MMHG

## 2021-01-22 VITALS — SYSTOLIC BLOOD PRESSURE: 134 MMHG | DIASTOLIC BLOOD PRESSURE: 76 MMHG

## 2021-01-22 VITALS — SYSTOLIC BLOOD PRESSURE: 153 MMHG | DIASTOLIC BLOOD PRESSURE: 67 MMHG

## 2021-01-22 LAB
ALBUMIN SERPL-MCNC: 3.7 G/DL (ref 3.2–5)
ALP SERPL-CCNC: 104 U/L (ref 38–126)
ANION GAP SERPL CALCULATED.3IONS-SCNC: 15 MMOL/L
AST SERPL-CCNC: 21 U/L (ref 14–36)
BASOPHILS NFR BLD AUTO: 0 % (ref 0–3)
BUN SERPL-MCNC: 13 MG/DL (ref 7–17)
BUN/CREAT SERPL: 29
CHLORIDE SERPL-SCNC: 102 MMOL/L (ref 95–108)
CO2 SERPL-SCNC: 23 MMOL/L (ref 22–30)
CREAT SERPL-MCNC: 0.4 MG/DL (ref 0.5–1)
EOSINOPHIL NFR BLD AUTO: 0 % (ref 0–8)
ERYTHROCYTE [DISTWIDTH] IN BLOOD BY AUTOMATED COUNT: 14.3 % (ref 11.5–15.5)
HCT VFR BLD AUTO: 45.5 % (ref 37–47)
HGB BLD-MCNC: 14.7 G/DL (ref 12–16)
IMM GRANULOCYTES NFR BLD: 0.4 % (ref 0–5)
LYMPHOCYTES NFR BLD: 14 % (ref 15–41)
MCH RBC QN AUTO: 27.9 PG  CALC (ref 26–32)
MCHC RBC AUTO-ENTMCNC: 32.3 G/DL CAL (ref 32–36)
MCV RBC AUTO: 86.5 FL  CALC (ref 80–100)
MONOCYTES NFR BLD AUTO: 5 % (ref 2–13)
NEUTROPHILS # BLD AUTO: 8.46 THOU/UL (ref 2–7.15)
NEUTROPHILS NFR BLD AUTO: 81 % (ref 42–76)
PLATELET # BLD AUTO: 351 THOU/UL (ref 130–400)
POTASSIUM SERPL-SCNC: 4.7 MMOL/L (ref 3.5–5.1)
PROT SERPL-MCNC: 6.9 G/DL (ref 6.3–8.2)
RBC # BLD AUTO: 5.26 MILL/UL (ref 4.2–5.6)
SODIUM SERPL-SCNC: 135 MMOL/L (ref 137–146)

## 2021-01-23 VITALS — DIASTOLIC BLOOD PRESSURE: 74 MMHG | SYSTOLIC BLOOD PRESSURE: 138 MMHG

## 2021-01-23 VITALS — DIASTOLIC BLOOD PRESSURE: 65 MMHG | SYSTOLIC BLOOD PRESSURE: 144 MMHG

## 2021-01-23 VITALS — SYSTOLIC BLOOD PRESSURE: 152 MMHG | DIASTOLIC BLOOD PRESSURE: 81 MMHG

## 2021-01-24 VITALS — SYSTOLIC BLOOD PRESSURE: 133 MMHG | DIASTOLIC BLOOD PRESSURE: 77 MMHG

## 2021-01-24 VITALS — SYSTOLIC BLOOD PRESSURE: 152 MMHG | DIASTOLIC BLOOD PRESSURE: 67 MMHG

## 2021-04-24 ENCOUNTER — HOSPITAL ENCOUNTER (OUTPATIENT)
Age: 56
Setting detail: OBSERVATION
LOS: 2 days | Discharge: HOME HEALTH | End: 2021-04-26
Admitting: INTERNAL MEDICINE
Payer: COMMERCIAL

## 2021-04-24 VITALS — DIASTOLIC BLOOD PRESSURE: 79 MMHG | SYSTOLIC BLOOD PRESSURE: 144 MMHG

## 2021-04-24 DIAGNOSIS — E87.1: ICD-10-CM

## 2021-04-24 DIAGNOSIS — E11.65: ICD-10-CM

## 2021-04-24 DIAGNOSIS — Z74.01: ICD-10-CM

## 2021-04-24 DIAGNOSIS — Z79.4: ICD-10-CM

## 2021-04-24 DIAGNOSIS — Z98.2: ICD-10-CM

## 2021-04-24 DIAGNOSIS — Z92.21: ICD-10-CM

## 2021-04-24 DIAGNOSIS — R11.2: ICD-10-CM

## 2021-04-24 DIAGNOSIS — E86.0: ICD-10-CM

## 2021-04-24 DIAGNOSIS — Z20.822: ICD-10-CM

## 2021-04-24 DIAGNOSIS — G81.94: ICD-10-CM

## 2021-04-24 DIAGNOSIS — F41.1: ICD-10-CM

## 2021-04-24 DIAGNOSIS — E66.01: ICD-10-CM

## 2021-04-24 DIAGNOSIS — C71.9: ICD-10-CM

## 2021-04-24 DIAGNOSIS — G40.909: ICD-10-CM

## 2021-04-24 DIAGNOSIS — N39.0: Primary | ICD-10-CM

## 2021-04-24 DIAGNOSIS — I10: ICD-10-CM

## 2021-04-24 DIAGNOSIS — B96.4: ICD-10-CM

## 2021-04-24 DIAGNOSIS — Z88.0: ICD-10-CM

## 2021-04-24 DIAGNOSIS — I47.1: ICD-10-CM

## 2021-04-24 LAB
ALBUMIN SERPL-MCNC: 4 G/DL (ref 3.2–5)
ALP SERPL-CCNC: 121 U/L (ref 38–126)
ANION GAP SERPL CALCULATED.3IONS-SCNC: 14 MMOL/L
AST SERPL-CCNC: 23 U/L (ref 14–36)
BACTERIA #/AREA URNS HPF: (no result) HPF
BASOPHILS NFR BLD AUTO: 0 % (ref 0–3)
BILIRUB UR QL STRIP.AUTO: NEGATIVE
BUN SERPL-MCNC: 11 MG/DL (ref 7–17)
BUN/CREAT SERPL: 22
CHLORIDE SERPL-SCNC: 98 MMOL/L (ref 95–108)
CO2 SERPL-SCNC: 22 MMOL/L (ref 22–30)
COLOR UR AUTO: YELLOW
CREAT SERPL-MCNC: 0.5 MG/DL (ref 0.5–1)
EOSINOPHIL NFR BLD AUTO: 0 % (ref 0–8)
ERYTHROCYTE [DISTWIDTH] IN BLOOD BY AUTOMATED COUNT: 13.1 % (ref 11.5–15.5)
GLUCOSE UR STRIP.AUTO-MCNC: >=1000 MG/DL
HCT VFR BLD AUTO: 46.1 % (ref 37–47)
HGB BLD-MCNC: 14.9 G/DL (ref 12–16)
HGB UR QL STRIP.AUTO: (no result)
IMM GRANULOCYTES NFR BLD: 0.4 % (ref 0–5)
KETONES UR STRIP.AUTO-MCNC: 15 MG/DL
LEUKOCYTE ESTERASE UR QL STRIP.AUTO: NEGATIVE
LIPASE SERPL-CCNC: 37 U/L (ref 23–300)
LYMPHOCYTES NFR BLD: 7 % (ref 15–41)
MCH RBC QN AUTO: 27.8 PG  CALC (ref 26–32)
MCHC RBC AUTO-ENTMCNC: 32.3 G/DL CAL (ref 32–36)
MCV RBC AUTO: 86 FL  CALC (ref 80–100)
MONOCYTES NFR BLD AUTO: 4 % (ref 2–13)
NEUTROPHILS # BLD AUTO: 9.06 THOU/UL (ref 2–7.15)
NEUTROPHILS NFR BLD AUTO: 89 % (ref 42–76)
NITRITE UR QL STRIP.AUTO: POSITIVE
PH UR STRIP.AUTO: 8.5 [PH] (ref 4.5–8)
PLATELET # BLD AUTO: 302 THOU/UL (ref 130–400)
POTASSIUM SERPL-SCNC: 4.2 MMOL/L (ref 3.5–5.1)
PROT SERPL-MCNC: 7.9 G/DL (ref 6.3–8.2)
PROT UR QL STRIP.AUTO: (no result) MG/DL
RBC # BLD AUTO: 5.36 MILL/UL (ref 4.2–5.6)
RBC #/AREA URNS HPF: (no result) RBC/HPF (ref 0–5)
SODIUM SERPL-SCNC: 130 MMOL/L (ref 137–146)
SP GR UR STRIP.AUTO: 1.01
SQUAMOUS URNS QL MICRO: (no result) EPI/HPF
UROBILINOGEN UR QL STRIP.AUTO: 0.2 E.U./DL
WBC #/AREA URNS HPF: (no result) WBC/HPF (ref 0–5)

## 2021-04-24 PROCEDURE — G0378 HOSPITAL OBSERVATION PER HR: HCPCS

## 2021-04-25 VITALS — SYSTOLIC BLOOD PRESSURE: 108 MMHG | DIASTOLIC BLOOD PRESSURE: 53 MMHG

## 2021-04-25 VITALS — SYSTOLIC BLOOD PRESSURE: 118 MMHG | DIASTOLIC BLOOD PRESSURE: 69 MMHG

## 2021-04-25 VITALS — SYSTOLIC BLOOD PRESSURE: 112 MMHG | DIASTOLIC BLOOD PRESSURE: 50 MMHG

## 2021-04-25 VITALS — DIASTOLIC BLOOD PRESSURE: 60 MMHG | SYSTOLIC BLOOD PRESSURE: 114 MMHG

## 2021-04-25 VITALS — DIASTOLIC BLOOD PRESSURE: 66 MMHG | SYSTOLIC BLOOD PRESSURE: 118 MMHG

## 2021-04-25 VITALS — DIASTOLIC BLOOD PRESSURE: 54 MMHG | SYSTOLIC BLOOD PRESSURE: 102 MMHG

## 2021-04-25 VITALS — DIASTOLIC BLOOD PRESSURE: 65 MMHG | SYSTOLIC BLOOD PRESSURE: 124 MMHG

## 2021-04-25 VITALS — DIASTOLIC BLOOD PRESSURE: 48 MMHG | SYSTOLIC BLOOD PRESSURE: 87 MMHG

## 2021-04-25 VITALS — DIASTOLIC BLOOD PRESSURE: 67 MMHG | SYSTOLIC BLOOD PRESSURE: 110 MMHG

## 2021-04-25 VITALS — DIASTOLIC BLOOD PRESSURE: 61 MMHG | SYSTOLIC BLOOD PRESSURE: 125 MMHG

## 2021-04-25 VITALS — DIASTOLIC BLOOD PRESSURE: 58 MMHG | SYSTOLIC BLOOD PRESSURE: 121 MMHG

## 2021-04-25 VITALS — SYSTOLIC BLOOD PRESSURE: 152 MMHG | DIASTOLIC BLOOD PRESSURE: 75 MMHG

## 2021-04-25 VITALS — SYSTOLIC BLOOD PRESSURE: 122 MMHG | DIASTOLIC BLOOD PRESSURE: 71 MMHG

## 2021-04-25 VITALS — SYSTOLIC BLOOD PRESSURE: 97 MMHG | DIASTOLIC BLOOD PRESSURE: 64 MMHG

## 2021-04-25 VITALS — SYSTOLIC BLOOD PRESSURE: 118 MMHG | DIASTOLIC BLOOD PRESSURE: 66 MMHG

## 2021-04-25 VITALS — DIASTOLIC BLOOD PRESSURE: 53 MMHG | SYSTOLIC BLOOD PRESSURE: 110 MMHG

## 2021-04-25 VITALS — SYSTOLIC BLOOD PRESSURE: 126 MMHG | DIASTOLIC BLOOD PRESSURE: 64 MMHG

## 2021-04-25 VITALS — DIASTOLIC BLOOD PRESSURE: 47 MMHG | SYSTOLIC BLOOD PRESSURE: 106 MMHG

## 2021-04-25 VITALS — SYSTOLIC BLOOD PRESSURE: 119 MMHG | DIASTOLIC BLOOD PRESSURE: 64 MMHG

## 2021-04-25 VITALS — SYSTOLIC BLOOD PRESSURE: 126 MMHG | DIASTOLIC BLOOD PRESSURE: 63 MMHG

## 2021-04-25 VITALS — SYSTOLIC BLOOD PRESSURE: 123 MMHG | DIASTOLIC BLOOD PRESSURE: 67 MMHG

## 2021-04-25 VITALS — SYSTOLIC BLOOD PRESSURE: 115 MMHG | DIASTOLIC BLOOD PRESSURE: 58 MMHG

## 2021-04-25 VITALS — DIASTOLIC BLOOD PRESSURE: 66 MMHG | SYSTOLIC BLOOD PRESSURE: 114 MMHG

## 2021-04-25 VITALS — SYSTOLIC BLOOD PRESSURE: 119 MMHG | DIASTOLIC BLOOD PRESSURE: 59 MMHG

## 2021-04-25 VITALS — DIASTOLIC BLOOD PRESSURE: 56 MMHG | SYSTOLIC BLOOD PRESSURE: 92 MMHG

## 2021-04-25 VITALS — DIASTOLIC BLOOD PRESSURE: 60 MMHG | SYSTOLIC BLOOD PRESSURE: 107 MMHG

## 2021-04-25 VITALS — SYSTOLIC BLOOD PRESSURE: 104 MMHG | DIASTOLIC BLOOD PRESSURE: 53 MMHG

## 2021-04-25 VITALS — DIASTOLIC BLOOD PRESSURE: 71 MMHG | SYSTOLIC BLOOD PRESSURE: 110 MMHG

## 2021-04-25 VITALS — DIASTOLIC BLOOD PRESSURE: 68 MMHG | SYSTOLIC BLOOD PRESSURE: 127 MMHG

## 2021-04-25 LAB
ALBUMIN SERPL-MCNC: 3.6 G/DL (ref 3.2–5)
ALP SERPL-CCNC: 129 U/L (ref 38–126)
ANION GAP SERPL CALCULATED.3IONS-SCNC: 13 MMOL/L
AST SERPL-CCNC: 26 U/L (ref 14–36)
BASOPHILS NFR BLD AUTO: 0 % (ref 0–3)
BUN SERPL-MCNC: 12 MG/DL (ref 7–17)
BUN/CREAT SERPL: 20
CHLORIDE SERPL-SCNC: 96 MMOL/L (ref 95–108)
CO2 SERPL-SCNC: 25 MMOL/L (ref 22–30)
CREAT SERPL-MCNC: 0.6 MG/DL (ref 0.5–1)
EOSINOPHIL NFR BLD AUTO: 0 % (ref 0–8)
ERYTHROCYTE [DISTWIDTH] IN BLOOD BY AUTOMATED COUNT: 13.1 % (ref 11.5–15.5)
HCT VFR BLD AUTO: 43.3 % (ref 37–47)
HGB BLD-MCNC: 14.2 G/DL (ref 12–16)
IMM GRANULOCYTES NFR BLD: 0.5 % (ref 0–5)
LYMPHOCYTES NFR BLD: 15 % (ref 15–41)
MCH RBC QN AUTO: 28.1 PG  CALC (ref 26–32)
MCHC RBC AUTO-ENTMCNC: 32.8 G/DL CAL (ref 32–36)
MCV RBC AUTO: 85.7 FL  CALC (ref 80–100)
MONOCYTES NFR BLD AUTO: 8 % (ref 2–13)
NEUTROPHILS # BLD AUTO: 7.95 THOU/UL (ref 2–7.15)
NEUTROPHILS NFR BLD AUTO: 77 % (ref 42–76)
PLATELET # BLD AUTO: 287 THOU/UL (ref 130–400)
POTASSIUM SERPL-SCNC: 4.2 MMOL/L (ref 3.5–5.1)
PROT SERPL-MCNC: 6.9 G/DL (ref 6.3–8.2)
RBC # BLD AUTO: 5.05 MILL/UL (ref 4.2–5.6)
SODIUM SERPL-SCNC: 130 MMOL/L (ref 137–146)

## 2021-04-26 VITALS — SYSTOLIC BLOOD PRESSURE: 127 MMHG | DIASTOLIC BLOOD PRESSURE: 50 MMHG

## 2021-04-26 VITALS — DIASTOLIC BLOOD PRESSURE: 81 MMHG | SYSTOLIC BLOOD PRESSURE: 135 MMHG

## 2021-04-26 VITALS — SYSTOLIC BLOOD PRESSURE: 119 MMHG | DIASTOLIC BLOOD PRESSURE: 69 MMHG

## 2021-04-26 LAB
ALBUMIN SERPL-MCNC: 3.1 G/DL (ref 3.2–5)
ALP SERPL-CCNC: 119 U/L (ref 38–126)
ANION GAP SERPL CALCULATED.3IONS-SCNC: 9 MMOL/L
AST SERPL-CCNC: 23 U/L (ref 14–36)
BASOPHILS NFR BLD AUTO: 0 % (ref 0–3)
BUN SERPL-MCNC: 12 MG/DL (ref 7–17)
BUN/CREAT SERPL: 25
CHLORIDE SERPL-SCNC: 99 MMOL/L (ref 95–108)
CO2 SERPL-SCNC: 28 MMOL/L (ref 22–30)
CREAT SERPL-MCNC: 0.5 MG/DL (ref 0.5–1)
EOSINOPHIL NFR BLD AUTO: 1 % (ref 0–8)
ERYTHROCYTE [DISTWIDTH] IN BLOOD BY AUTOMATED COUNT: 13.1 % (ref 11.5–15.5)
HCT VFR BLD AUTO: 38.8 % (ref 37–47)
HGB BLD-MCNC: 12.6 G/DL (ref 12–16)
IMM GRANULOCYTES NFR BLD: 0.2 % (ref 0–5)
LYMPHOCYTES NFR BLD: 26 % (ref 15–41)
MCH RBC QN AUTO: 27.8 PG  CALC (ref 26–32)
MCHC RBC AUTO-ENTMCNC: 32.5 G/DL CAL (ref 32–36)
MCV RBC AUTO: 85.5 FL  CALC (ref 80–100)
MONOCYTES NFR BLD AUTO: 9 % (ref 2–13)
NEUTROPHILS # BLD AUTO: 5.26 THOU/UL (ref 2–7.15)
NEUTROPHILS NFR BLD AUTO: 63 % (ref 42–76)
PLATELET # BLD AUTO: 269 THOU/UL (ref 130–400)
POTASSIUM SERPL-SCNC: 3.6 MMOL/L (ref 3.5–5.1)
PROT SERPL-MCNC: 6.3 G/DL (ref 6.3–8.2)
RBC # BLD AUTO: 4.54 MILL/UL (ref 4.2–5.6)
SODIUM SERPL-SCNC: 132 MMOL/L (ref 137–146)

## 2021-10-18 NOTE — NUR
----- Message from Lexie Khan sent at 10/11/2018  7:24 AM CDT -----  Contact: [facility]  Message from [employer], System Message sent at 10/11/2018  6:02 AM CDT -----    Appointment Request From: Roger Zavaleta Jr.    With Provider: Jane Almeida DO [[facility]]    Preferred Date Range: 10/11/2018 - 10/11/2018    Preferred Times: Any time    Reason for visit: Nosebleeds and high blood pressure    Comments:  Constant nosebleeds and HBP       Notified Dr. Almeida is out of the office until next week, he stated he did not need to be seen today so he scheduled appt for Monday.    PT IS RESTING IN BED WITH NO S/S OF DISTRESS NOTED. PT STATED THAT MEDICATIONS
HELPED WITH HER PAIN AND DIZZINESS. CALL LIGHT IN REACH. Quality 111:Pneumonia Vaccination Status For Older Adults: Pneumococcal Vaccination not Administered or Previously Received, Reason not Otherwise Specified Quality 130: Documentation Of Current Medications In The Medical Record: Current Medications Documented Quality 402: Tobacco Use And Help With Quitting Among Adolescents: Patient screened for tobacco and never smoked Detail Level: Detailed Quality 431: Preventive Care And Screening: Unhealthy Alcohol Use - Screening: Patient not identified as an unhealthy alcohol user when screened for unhealthy alcohol use using a systematic screening method Quality 110: Preventive Care And Screening: Influenza Immunization: Influenza immunization was not ordered or administered, reason not given

## 2022-09-30 ENCOUNTER — HOSPITAL ENCOUNTER (OUTPATIENT)
Dept: HOSPITAL 82 - ED | Age: 57
Setting detail: OBSERVATION
LOS: 5 days | Discharge: HOME HEALTH | End: 2022-10-05
Attending: INTERNAL MEDICINE | Admitting: INTERNAL MEDICINE
Payer: MEDICARE

## 2022-09-30 VITALS — HEIGHT: 62 IN | BODY MASS INDEX: 46.09 KG/M2 | WEIGHT: 250.44 LBS

## 2022-09-30 VITALS — DIASTOLIC BLOOD PRESSURE: 84 MMHG | SYSTOLIC BLOOD PRESSURE: 157 MMHG

## 2022-09-30 VITALS — DIASTOLIC BLOOD PRESSURE: 82 MMHG | SYSTOLIC BLOOD PRESSURE: 152 MMHG

## 2022-09-30 VITALS — DIASTOLIC BLOOD PRESSURE: 82 MMHG | SYSTOLIC BLOOD PRESSURE: 155 MMHG

## 2022-09-30 VITALS — DIASTOLIC BLOOD PRESSURE: 78 MMHG | SYSTOLIC BLOOD PRESSURE: 129 MMHG

## 2022-09-30 VITALS — DIASTOLIC BLOOD PRESSURE: 84 MMHG | SYSTOLIC BLOOD PRESSURE: 136 MMHG

## 2022-09-30 VITALS — DIASTOLIC BLOOD PRESSURE: 95 MMHG | SYSTOLIC BLOOD PRESSURE: 148 MMHG

## 2022-09-30 VITALS — SYSTOLIC BLOOD PRESSURE: 167 MMHG | DIASTOLIC BLOOD PRESSURE: 84 MMHG

## 2022-09-30 VITALS — DIASTOLIC BLOOD PRESSURE: 71 MMHG | SYSTOLIC BLOOD PRESSURE: 143 MMHG

## 2022-09-30 VITALS — SYSTOLIC BLOOD PRESSURE: 137 MMHG | DIASTOLIC BLOOD PRESSURE: 60 MMHG

## 2022-09-30 VITALS — DIASTOLIC BLOOD PRESSURE: 62 MMHG | SYSTOLIC BLOOD PRESSURE: 125 MMHG

## 2022-09-30 VITALS — SYSTOLIC BLOOD PRESSURE: 142 MMHG | DIASTOLIC BLOOD PRESSURE: 68 MMHG

## 2022-09-30 VITALS — DIASTOLIC BLOOD PRESSURE: 65 MMHG | SYSTOLIC BLOOD PRESSURE: 131 MMHG

## 2022-09-30 DIAGNOSIS — G81.94: ICD-10-CM

## 2022-09-30 DIAGNOSIS — H81.10: Primary | ICD-10-CM

## 2022-09-30 DIAGNOSIS — G40.909: ICD-10-CM

## 2022-09-30 DIAGNOSIS — Z20.822: ICD-10-CM

## 2022-09-30 DIAGNOSIS — Z23: ICD-10-CM

## 2022-09-30 DIAGNOSIS — E11.9: ICD-10-CM

## 2022-09-30 DIAGNOSIS — F41.9: ICD-10-CM

## 2022-09-30 DIAGNOSIS — C71.9: ICD-10-CM

## 2022-09-30 DIAGNOSIS — Z74.01: ICD-10-CM

## 2022-09-30 DIAGNOSIS — F41.1: ICD-10-CM

## 2022-09-30 DIAGNOSIS — I10: ICD-10-CM

## 2022-09-30 DIAGNOSIS — E66.01: ICD-10-CM

## 2022-09-30 LAB
ALBUMIN SERPL-MCNC: 4 G/DL (ref 3.2–5)
ALP SERPL-CCNC: 95 U/L (ref 38–126)
AMYLASE SERPL-CCNC: 53 U/L (ref 30–110)
ANION GAP SERPL CALCULATED.3IONS-SCNC: 15 MMOL/L
AST SERPL-CCNC: 20 U/L (ref 14–36)
BASOPHILS NFR BLD AUTO: 1 % (ref 0–3)
BILIRUB UR QL STRIP.AUTO: (no result)
BUN SERPL-MCNC: 11 MG/DL (ref 7–17)
BUN/CREAT SERPL: 16
CHLORIDE SERPL-SCNC: 98 MMOL/L (ref 95–108)
CO2 SERPL-SCNC: 27 MMOL/L (ref 22–30)
COLOR UR AUTO: YELLOW
CREAT SERPL-MCNC: 0.7 MG/DL (ref 0.5–1)
EOSINOPHIL NFR BLD AUTO: 1 % (ref 0–8)
ERYTHROCYTE [DISTWIDTH] IN BLOOD BY AUTOMATED COUNT: 12.8 % (ref 11.5–15.5)
GLUCOSE UR STRIP.AUTO-MCNC: 500 MG/DL
HCT VFR BLD AUTO: 46.9 % (ref 37–47)
HGB BLD-MCNC: 15.6 G/DL (ref 12–16)
HGB UR QL STRIP.AUTO: NEGATIVE
IMM GRANULOCYTES NFR BLD: 0.2 % (ref 0–5)
KETONES UR STRIP.AUTO-MCNC: 40 MG/DL
LEUKOCYTE ESTERASE UR QL STRIP.AUTO: NEGATIVE
LIPASE SERPL-CCNC: 23 U/L (ref 23–300)
LYMPHOCYTES NFR BLD: 10 % (ref 15–41)
MCH RBC QN AUTO: 28.7 PG  CALC (ref 26–32)
MCHC RBC AUTO-ENTMCNC: 33.3 G/DL CAL (ref 32–36)
MCV RBC AUTO: 86.4 FL  CALC (ref 80–100)
MONOCYTES NFR BLD AUTO: 3 % (ref 2–13)
MYOGLOBIN SERPL-MCNC: 36 NG/ML (ref 0–62)
NEUTROPHILS # BLD AUTO: 9.14 THOU/UL (ref 2–7.15)
NEUTROPHILS NFR BLD AUTO: 85 % (ref 42–76)
NITRITE UR QL STRIP.AUTO: NEGATIVE
PH UR STRIP.AUTO: 5.5 [PH] (ref 4.5–8)
PLATELET # BLD AUTO: 358 THOU/UL (ref 130–400)
POTASSIUM SERPL-SCNC: 3.3 MMOL/L (ref 3.5–5.1)
PROT SERPL-MCNC: 7.7 G/DL (ref 6.3–8.2)
PROT UR QL STRIP.AUTO: NEGATIVE MG/DL
RBC # BLD AUTO: 5.43 MILL/UL (ref 4.2–5.6)
SODIUM SERPL-SCNC: 137 MMOL/L (ref 137–146)
SP GR UR STRIP.AUTO: >=1.03
UROBILINOGEN UR QL STRIP.AUTO: 0.2 E.U./DL

## 2022-09-30 PROCEDURE — 0T9B70Z DRAINAGE OF BLADDER WITH DRAINAGE DEVICE, VIA NATURAL OR ARTIFICIAL OPENING: ICD-10-PCS | Performed by: FAMILY MEDICINE

## 2022-10-01 VITALS — SYSTOLIC BLOOD PRESSURE: 137 MMHG | DIASTOLIC BLOOD PRESSURE: 65 MMHG

## 2022-10-01 VITALS — SYSTOLIC BLOOD PRESSURE: 144 MMHG | DIASTOLIC BLOOD PRESSURE: 71 MMHG

## 2022-10-01 VITALS — DIASTOLIC BLOOD PRESSURE: 66 MMHG | SYSTOLIC BLOOD PRESSURE: 139 MMHG

## 2022-10-01 VITALS — DIASTOLIC BLOOD PRESSURE: 64 MMHG | SYSTOLIC BLOOD PRESSURE: 118 MMHG

## 2022-10-01 VITALS — SYSTOLIC BLOOD PRESSURE: 147 MMHG | DIASTOLIC BLOOD PRESSURE: 75 MMHG

## 2022-10-01 VITALS — DIASTOLIC BLOOD PRESSURE: 87 MMHG | SYSTOLIC BLOOD PRESSURE: 157 MMHG

## 2022-10-01 VITALS — DIASTOLIC BLOOD PRESSURE: 64 MMHG | SYSTOLIC BLOOD PRESSURE: 131 MMHG

## 2022-10-01 PROCEDURE — 3E02340 INTRODUCTION OF INFLUENZA VACCINE INTO MUSCLE, PERCUTANEOUS APPROACH: ICD-10-PCS | Performed by: INTERNAL MEDICINE

## 2022-10-01 NOTE — NUR
RECIEVED REPORT ON PT. PT BREATHING EVEN AND NONLABORED. PT DENIES PAIN OR
DISCOMFORT. PT ABDOMEN NON-DISTENDED, NON-TENDER WITH ACTIVE BOWEL SOUNDS. IV
SITE PT IVF RUNNING AS ORDERED. IV SITE APPEARS HEALTHY. PT DENIES ANY NEEDS
AT THIS TIME. CALL LIGHT IN REACH, ALL SAFETY PRECAUTIONS IN PLACE AT THIS
TIME.

## 2022-10-01 NOTE — NUR
PATIENT RESTING IN BED AWAKE, EATING LUNCH. PATIENT DENIES ANY PAIN OR
DISCOMFORT AT THIS TIME. BED IS IN LOWEST POSITION, SAFETY PRECAUTIONS IN
PLACE.

## 2022-10-01 NOTE — NUR
PATIENT RESTING IN BED. REMAINS ALERT. MAKES NEEDS KNOWN. DENIES ANY NAUSEA AT
THIS TIME. BED REMAINS IN LOW POSITION. CALL LIGHT IN REACH.

## 2022-10-01 NOTE — NUR
RECEIVED REPORT FROM NIGHT SHIFT RN.  PATIENT IS RESTING IN BED AWAKE IN LOW
SEMI-PRATHER'S POSITION, AWAKE WATCHING TV.  ASSEMENT COMPLETED. PATIENT IS
A&OX3, ABLE TO VERBALIZE NEEDS.  NO SIGNS OF PAIN OR DISCOMFORT NOTED NOR
VERBALIZED AT THIS TIME. BED IN LOWEST POSITION, SAFETY PRECAUSTIONS IN PLACE.

## 2022-10-01 NOTE — NUR
PATIENT ADMITTED TO ROOM 291, VIA STRETCHER. TRANSFERRED PATIENT FROM
STRETCHER TO ROOM BED WITH 3 STAFF MEMBERS HELP. PATIENT IS ALERT AND
ORIENTED. ABLE TO MAKE NEEDS KNOWN. ANSWERS APPROPRIATELY. ORIENTED PATIENT TO
ROOM CALL LIGHT AND SURROUNDINGS. ASSESSMENT COMPLETE. RASH LIKE AREA TO LEFT
SIDE OF BACK. PATIENT DENIES ANY PAIN. NO DISTRESS. APPEARS VERY NAUSEOUS.
WILL LOOK AT EMAR TO SEE WHAT MEDICATION IS AVAILABLE. PATIENT CLEANED UP DUE
TOO LARGE AMOUNTS OF DRIED FECES ON HER FROM HOME. PATIENT ALSO APPEARS TO
HAVE OLD SPIDER BITE AREA TO THE RIGHT SHIN. FLORENTINO PATENT DRAINING DARK YELLOW
URINE. BED REMAINS IN LOW POSITION, CALL LIGHT IN REACH.

## 2022-10-01 NOTE — NUR
PATIENT RESTING IN BED, IN  LOW SEMI-PRATHER'S POSITION, AWAKE, WATCHING TV.
PATIENT IS ABLE TO VERBALIZE NEEDS, NO COMPLAINTS VEBALIZED AT THIS TIME.  BED
IN LOWEST POSITION, CALL LIGHT WITHIN REACH.

## 2022-10-02 VITALS — DIASTOLIC BLOOD PRESSURE: 78 MMHG | SYSTOLIC BLOOD PRESSURE: 133 MMHG

## 2022-10-02 VITALS — DIASTOLIC BLOOD PRESSURE: 81 MMHG | SYSTOLIC BLOOD PRESSURE: 166 MMHG

## 2022-10-02 VITALS — DIASTOLIC BLOOD PRESSURE: 70 MMHG | SYSTOLIC BLOOD PRESSURE: 150 MMHG

## 2022-10-02 VITALS — SYSTOLIC BLOOD PRESSURE: 142 MMHG | DIASTOLIC BLOOD PRESSURE: 75 MMHG

## 2022-10-02 VITALS — DIASTOLIC BLOOD PRESSURE: 51 MMHG | SYSTOLIC BLOOD PRESSURE: 134 MMHG

## 2022-10-02 LAB
ANION GAP SERPL CALCULATED.3IONS-SCNC: 12 MMOL/L
BUN SERPL-MCNC: 10 MG/DL (ref 7–17)
BUN/CREAT SERPL: 15
CHLORIDE SERPL-SCNC: 106 MMOL/L (ref 95–108)
CO2 SERPL-SCNC: 25 MMOL/L (ref 22–30)
CREAT SERPL-MCNC: 0.6 MG/DL (ref 0.5–1)
MAGNESIUM SERPL-MCNC: 1.7 MG/DL (ref 1.6–2.3)
POTASSIUM SERPL-SCNC: 3.4 MMOL/L (ref 3.5–5.1)
SODIUM SERPL-SCNC: 140 MMOL/L (ref 137–146)

## 2022-10-02 NOTE — NUR
PT REMAINS IN STABLE CONDITION. BREATHING EVEN AND NON LABORED. PT PLAYIGN
GAME ON PHONE. PT DENIES ANY NEEDS AT THIS TIME. CALL LIGHT IN REACH ALL
SAFETY PRECAUTIONS IN PLACE AT THIS TIME.

## 2022-10-02 NOTE — NUR
PT RESTING IN ROOM, PLAYING GAME ON PHONE. PT BREATHING REMAINS THE SAME. CALL
LIGHT IN REACH, SAFETY PRECAUTIONS IN PLACE AT THIS TIME.

## 2022-10-02 NOTE — NUR
SHIFT CHANGE REPORT, PT AWAKE ALERT AND ORIENTED RESTING IN BED, NO C/O
NDISCOMFORT AT THIS TIME, REPOSITIONED AND SET UP FOR MEAL, STATES SHE DOES
NOT AMBULATE AT HOME BUT STAYS IN BED AND HER SON TAKES CARE OF HER, INFORMED
WILL BE GIVEN BATH LATER AFTER MEAL AND ACCEPTS. CALL BELL IN REACH AND BED
LOCKED IN LOWEST POSITION.

## 2022-10-03 VITALS — DIASTOLIC BLOOD PRESSURE: 73 MMHG | SYSTOLIC BLOOD PRESSURE: 135 MMHG

## 2022-10-03 VITALS — DIASTOLIC BLOOD PRESSURE: 67 MMHG | SYSTOLIC BLOOD PRESSURE: 135 MMHG

## 2022-10-03 VITALS — DIASTOLIC BLOOD PRESSURE: 59 MMHG | SYSTOLIC BLOOD PRESSURE: 122 MMHG

## 2022-10-03 VITALS — SYSTOLIC BLOOD PRESSURE: 139 MMHG | DIASTOLIC BLOOD PRESSURE: 71 MMHG

## 2022-10-03 LAB
ANION GAP SERPL CALCULATED.3IONS-SCNC: 13 MMOL/L
BUN SERPL-MCNC: 6 MG/DL (ref 7–17)
BUN/CREAT SERPL: 10
CHLORIDE SERPL-SCNC: 108 MMOL/L (ref 95–108)
CO2 SERPL-SCNC: 26 MMOL/L (ref 22–30)
CREAT SERPL-MCNC: 0.6 MG/DL (ref 0.5–1)
POTASSIUM SERPL-SCNC: 3.7 MMOL/L (ref 3.5–5.1)
SODIUM SERPL-SCNC: 143 MMOL/L (ref 137–146)

## 2022-10-03 NOTE — NUR
PT GLUCOSE,  BOARD READ WRONG READING . BOARD HAS MANY GLUCOSE READING READ
FOR WRONG PT. ORIGINAL READING 83. READING READ 182 . INSULIN GIVEN. NP
NOTIFIED. GLUCOSE REASSESSED 94 DEXTROSE PROVIDED.

## 2022-10-03 NOTE — NUR
PT RESTING IN HIGH FOWLERS POSITION. ASSESSMENT AND VS COMPLETED. PT STATES
FACIAL DROOP IS FROM BRAIN TUMOR. RIGHT SHIN REDNESS. DUE TO OLD SPIDER BITE.
PT DENIES ADDITIONAL NEEDS AT THE TIME ALL SAFETY PRECAUTIONS IN PLACE.

## 2022-10-03 NOTE — NUR
PT RESTING IN HIGH FOWLERS POSITION. PT C/O  NAUSEA/DIZZY. NP NEW ORDERS IN .
MEDICATIONS PROVIDED PER EMAR.

## 2022-10-04 VITALS — DIASTOLIC BLOOD PRESSURE: 68 MMHG | SYSTOLIC BLOOD PRESSURE: 142 MMHG

## 2022-10-04 VITALS — DIASTOLIC BLOOD PRESSURE: 77 MMHG | SYSTOLIC BLOOD PRESSURE: 153 MMHG

## 2022-10-04 VITALS — SYSTOLIC BLOOD PRESSURE: 140 MMHG | DIASTOLIC BLOOD PRESSURE: 57 MMHG

## 2022-10-04 VITALS — DIASTOLIC BLOOD PRESSURE: 63 MMHG | SYSTOLIC BLOOD PRESSURE: 150 MMHG

## 2022-10-04 LAB
ANION GAP SERPL CALCULATED.3IONS-SCNC: 10 MMOL/L
BUN SERPL-MCNC: 7 MG/DL (ref 7–17)
BUN/CREAT SERPL: 10
CHLORIDE SERPL-SCNC: 105 MMOL/L (ref 95–108)
CO2 SERPL-SCNC: 28 MMOL/L (ref 22–30)
CREAT SERPL-MCNC: 0.7 MG/DL (ref 0.5–1)
MAGNESIUM SERPL-MCNC: 1.9 MG/DL (ref 1.6–2.3)
POTASSIUM SERPL-SCNC: 3.6 MMOL/L (ref 3.5–5.1)
SODIUM SERPL-SCNC: 139 MMOL/L (ref 137–146)

## 2022-10-04 NOTE — NUR
PT INFORMED AID OF STOMACH BEING UPSET . MEDICATION ORDERS TO BE VERIFIED PER
PHARMACY TO BE PROVIDED.

## 2022-10-04 NOTE — NUR
PT RESTING IN HIGH FOWLERS POSITION.PT A/O PT ASSESMENT AND VS COMPLETE. PT
DENIES NAUSEA OR ANY DIZZINESS AT THE TIME. PT IV SITE NOTED TO RAC NOTED.
URINARY CATH IN PLACE . PT DENIES ADDITIONAL NEEDS AT THE TIME ALL SAFETY
PRECAUTIONS IN PLACE WITH CALL LIGHT IN REACH.

## 2022-10-05 VITALS — SYSTOLIC BLOOD PRESSURE: 144 MMHG | DIASTOLIC BLOOD PRESSURE: 59 MMHG

## 2022-10-05 VITALS — DIASTOLIC BLOOD PRESSURE: 63 MMHG | SYSTOLIC BLOOD PRESSURE: 140 MMHG

## 2022-10-05 VITALS — DIASTOLIC BLOOD PRESSURE: 59 MMHG | SYSTOLIC BLOOD PRESSURE: 144 MMHG

## 2022-10-05 LAB
ANION GAP SERPL CALCULATED.3IONS-SCNC: 11 MMOL/L
BUN SERPL-MCNC: 8 MG/DL (ref 7–17)
BUN/CREAT SERPL: 13
CHLORIDE SERPL-SCNC: 104 MMOL/L (ref 95–108)
CO2 SERPL-SCNC: 30 MMOL/L (ref 22–30)
CREAT SERPL-MCNC: 0.6 MG/DL (ref 0.5–1)
MAGNESIUM SERPL-MCNC: 1.9 MG/DL (ref 1.6–2.3)
POTASSIUM SERPL-SCNC: 3.7 MMOL/L (ref 3.5–5.1)
SODIUM SERPL-SCNC: 141 MMOL/L (ref 137–146)

## 2022-11-01 ENCOUNTER — HOSPITAL ENCOUNTER (INPATIENT)
Dept: HOSPITAL 82 - ED | Age: 57
LOS: 8 days | Discharge: HOME HEALTH | DRG: 177 | End: 2022-11-09
Attending: INTERNAL MEDICINE | Admitting: INTERNAL MEDICINE
Payer: MEDICARE

## 2022-11-01 VITALS — BODY MASS INDEX: 39.76 KG/M2 | HEIGHT: 62 IN | WEIGHT: 216.05 LBS

## 2022-11-01 VITALS — DIASTOLIC BLOOD PRESSURE: 77 MMHG | SYSTOLIC BLOOD PRESSURE: 126 MMHG

## 2022-11-01 VITALS — SYSTOLIC BLOOD PRESSURE: 136 MMHG | DIASTOLIC BLOOD PRESSURE: 107 MMHG

## 2022-11-01 VITALS — SYSTOLIC BLOOD PRESSURE: 154 MMHG | DIASTOLIC BLOOD PRESSURE: 60 MMHG

## 2022-11-01 VITALS — DIASTOLIC BLOOD PRESSURE: 80 MMHG | SYSTOLIC BLOOD PRESSURE: 136 MMHG

## 2022-11-01 DIAGNOSIS — R62.7: ICD-10-CM

## 2022-11-01 DIAGNOSIS — G40.909: ICD-10-CM

## 2022-11-01 DIAGNOSIS — J12.82: ICD-10-CM

## 2022-11-01 DIAGNOSIS — J96.01: ICD-10-CM

## 2022-11-01 DIAGNOSIS — U07.1: Primary | ICD-10-CM

## 2022-11-01 DIAGNOSIS — G81.94: ICD-10-CM

## 2022-11-01 DIAGNOSIS — E66.01: ICD-10-CM

## 2022-11-01 DIAGNOSIS — C71.9: ICD-10-CM

## 2022-11-01 DIAGNOSIS — F41.1: ICD-10-CM

## 2022-11-01 DIAGNOSIS — Z79.4: ICD-10-CM

## 2022-11-01 DIAGNOSIS — E11.65: ICD-10-CM

## 2022-11-01 DIAGNOSIS — I10: ICD-10-CM

## 2022-11-01 DIAGNOSIS — T18.9XXA: ICD-10-CM
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
## 2022-11-01 DIAGNOSIS — T41.5X6A: ICD-10-CM

## 2022-11-01 DIAGNOSIS — Z99.81: ICD-10-CM

## 2022-11-01 DIAGNOSIS — Z91.138: ICD-10-CM

## 2022-11-01 DIAGNOSIS — F32.A: ICD-10-CM

## 2022-11-01 LAB
ALBUMIN SERPL-MCNC: 3.5 G/DL (ref 3.2–5)
ALP SERPL-CCNC: 75 U/L (ref 38–126)
ANION GAP SERPL CALCULATED.3IONS-SCNC: 10 MMOL/L
AST SERPL-CCNC: 24 U/L (ref 14–36)
BASOPHILS NFR BLD AUTO: 0 % (ref 0–3)
BUN SERPL-MCNC: 4 MG/DL (ref 7–17)
BUN/CREAT SERPL: 8
CHLORIDE SERPL-SCNC: 102 MMOL/L (ref 95–108)
CO2 SERPL-SCNC: 28 MMOL/L (ref 22–30)
CREAT SERPL-MCNC: 0.5 MG/DL (ref 0.5–1)
D DIMER PPP FEU-MCNC: 0.22 MG/L (ref 0.19–0.6)
EOSINOPHIL NFR BLD AUTO: 2 % (ref 0–8)
ERYTHROCYTE [DISTWIDTH] IN BLOOD BY AUTOMATED COUNT: 13 % (ref 11.5–15.5)
HCT VFR BLD AUTO: 43 % (ref 37–47)
HGB BLD-MCNC: 13.9 G/DL (ref 12–16)
IMM GRANULOCYTES NFR BLD: 0.1 % (ref 0–5)
INR PPP: 1 RATIO (ref 0.7–1.3)
LYMPHOCYTES NFR BLD: 11 % (ref 15–41)
MCH RBC QN AUTO: 28.6 PG  CALC (ref 26–32)
MCHC RBC AUTO-ENTMCNC: 32.3 G/DL CAL (ref 32–36)
MCV RBC AUTO: 88.5 FL  CALC (ref 80–100)
MONOCYTES NFR BLD AUTO: 5 % (ref 2–13)
MYOGLOBIN SERPL-MCNC: 27 NG/ML (ref 0–62)
NEUTROPHILS # BLD AUTO: 6.13 THOU/UL (ref 2–7.15)
NEUTROPHILS NFR BLD AUTO: 82 % (ref 42–76)
PLATELET # BLD AUTO: 207 THOU/UL (ref 130–400)
POTASSIUM SERPL-SCNC: 4.1 MMOL/L (ref 3.5–5.1)
PROT SERPL-MCNC: 6.7 G/DL (ref 6.3–8.2)
PROTHROMBIN TIME: 9.5 SECONDS (ref 9–12.5)
RBC # BLD AUTO: 4.86 MILL/UL (ref 4.2–5.6)
SODIUM SERPL-SCNC: 137 MMOL/L (ref 137–146)

## 2022-11-01 NOTE — NUR
DOCTOR IN ROOM TO ASSESS PT, WHEN DOCTOR LIFTED PT TO ASSESS BREATH SOUNDS, PT
BUTTOCKS AND BACK WERE RED AND WET TO THE TOUCH. EMS AND PT CORROBORATED LAST
WEEK OXYGEN BROKE, SO WAS UNABLE TO GET OXYGEN SINCE LAST WEEK.

## 2022-11-01 NOTE — NUR
BLOOD CULTURES TAKEN.  EKG COMPLETED.  BELONGING LIST COMPLETED.  LINENS PACKED
FOR HOME.  PULL UP CHANGED.  BUTTOCKS REDDENED BUT NO OPEN AREAS NOTED.  DRIED
CRUMBS PRESENT. DRIED CRAIG.  FRESH ATTENDS/FRESH LINENS.  PT ALSO HAS A LARGE
FRESH HEALING SCAR PRESENT ON RIGHT SHIN.  ANTIBIOTICS STARTED.
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
## 2022-11-02 VITALS — SYSTOLIC BLOOD PRESSURE: 134 MMHG | DIASTOLIC BLOOD PRESSURE: 62 MMHG

## 2022-11-02 VITALS — DIASTOLIC BLOOD PRESSURE: 62 MMHG | SYSTOLIC BLOOD PRESSURE: 136 MMHG

## 2022-11-02 VITALS — SYSTOLIC BLOOD PRESSURE: 158 MMHG | DIASTOLIC BLOOD PRESSURE: 67 MMHG

## 2022-11-02 VITALS — SYSTOLIC BLOOD PRESSURE: 156 MMHG | DIASTOLIC BLOOD PRESSURE: 79 MMHG

## 2022-11-02 VITALS — SYSTOLIC BLOOD PRESSURE: 140 MMHG | DIASTOLIC BLOOD PRESSURE: 80 MMHG

## 2022-11-02 VITALS — DIASTOLIC BLOOD PRESSURE: 62 MMHG | SYSTOLIC BLOOD PRESSURE: 134 MMHG

## 2022-11-02 PROCEDURE — XW033E5 INTRODUCTION OF REMDESIVIR ANTI-INFECTIVE INTO PERIPHERAL VEIN, PERCUTANEOUS APPROACH, NEW TECHNOLOGY GROUP 5: ICD-10-PCS | Performed by: INTERNAL MEDICINE

## 2022-11-02 NOTE — NUR
PATIENT IS RESTING. NO SXS OF DISTRESS. CALL LIGHT AND BEDSIDE TABLE WITH
REACH. BED ALARM ON. ADVISED TO CALL IF NEEDING ANYTHING. PATIENT VERBALIZED
UNDERSTANDING.

## 2022-11-02 NOTE — NUR
GOT REPORT FROM NIGHT SHIFT NURSE. PATIENT IS AOX3. PATIENT IS SITTING UP IN
BED EATING BREAKFAST AND WATCHING TV. PATIENT IS REFUSING BED BATH OR
CHANGING. PATIENT'S BED IS SOAKED WITH URINE.  GOT PATIENT TO AGREE TO CHANGE
DEPENDS AND BED SHEETS. PATIENT STILL REFUSED BATH.  CALL LIGHT AND BEDSIDE
TABLE ARE IN REACH OF PATIENT AND ADVISED TO CALL IF NEEDING ANYTHING. PATIENT
VERBALIZED UNDERSTANDING.

## 2022-11-02 NOTE — NUR
PATIENT ASSEMENT COMPLETED. CURRENTLY ON 3L VIA NASAL CANNULA. PATIENT HAS A
PRODUCTIVE COUGH, COVID POSITIVE ON PRECAUTIONS. CALL LIGHT AN BEDSIDE TABLE
WTIHIN REACH.

## 2022-11-02 NOTE — NUR
PATIENT COMPLAINING OF SHORTNESS OF BREATH, CNA REPORTED TO WRITTER.
OXYGEN SATURATION 96%. WHEN WRITTER IN TO ASSES, PATIENT DENIES SHORTNESS OF
BREATH AND INSTEAD HANDS A WASHCLOTH TO NURSE AND ASKS FOR IT TO BE MOISTENED.

## 2022-11-03 VITALS — DIASTOLIC BLOOD PRESSURE: 67 MMHG | SYSTOLIC BLOOD PRESSURE: 119 MMHG

## 2022-11-03 VITALS — DIASTOLIC BLOOD PRESSURE: 78 MMHG | SYSTOLIC BLOOD PRESSURE: 156 MMHG

## 2022-11-03 VITALS — SYSTOLIC BLOOD PRESSURE: 135 MMHG | DIASTOLIC BLOOD PRESSURE: 75 MMHG

## 2022-11-03 VITALS — DIASTOLIC BLOOD PRESSURE: 75 MMHG | SYSTOLIC BLOOD PRESSURE: 141 MMHG

## 2022-11-03 VITALS — SYSTOLIC BLOOD PRESSURE: 119 MMHG | DIASTOLIC BLOOD PRESSURE: 67 MMHG

## 2022-11-03 LAB
ALBUMIN SERPL-MCNC: 2.9 G/DL (ref 3.2–5)
ALP SERPL-CCNC: 66 U/L (ref 38–126)
ANION GAP SERPL CALCULATED.3IONS-SCNC: 10 MMOL/L
AST SERPL-CCNC: 14 U/L (ref 14–36)
BASOPHILS NFR BLD AUTO: 0 % (ref 0–3)
BUN SERPL-MCNC: 6 MG/DL (ref 7–17)
BUN/CREAT SERPL: 12
CHLORIDE SERPL-SCNC: 102 MMOL/L (ref 95–108)
CO2 SERPL-SCNC: 30 MMOL/L (ref 22–30)
CREAT SERPL-MCNC: 0.6 MG/DL (ref 0.5–1)
CRP SERPL-MCNC: 3.8 MG/DL (ref 0–0.9)
EOSINOPHIL NFR BLD AUTO: 1 % (ref 0–8)
ERYTHROCYTE [DISTWIDTH] IN BLOOD BY AUTOMATED COUNT: 12.9 % (ref 11.5–15.5)
HCT VFR BLD AUTO: 39.5 % (ref 37–47)
HGB BLD-MCNC: 12.9 G/DL (ref 12–16)
IMM GRANULOCYTES NFR BLD: 0.7 % (ref 0–5)
LYMPHOCYTES NFR BLD: 19 % (ref 15–41)
MCH RBC QN AUTO: 29.1 PG  CALC (ref 26–32)
MCHC RBC AUTO-ENTMCNC: 32.7 G/DL CAL (ref 32–36)
MCV RBC AUTO: 89 FL  CALC (ref 80–100)
MONOCYTES NFR BLD AUTO: 11 % (ref 2–13)
NEUTROPHILS # BLD AUTO: 3.81 THOU/UL (ref 2–7.15)
NEUTROPHILS NFR BLD AUTO: 69 % (ref 42–76)
PLATELET # BLD AUTO: 228 THOU/UL (ref 130–400)
POTASSIUM SERPL-SCNC: 3.8 MMOL/L (ref 3.5–5.1)
PROT SERPL-MCNC: 5.6 G/DL (ref 6.3–8.2)
RBC # BLD AUTO: 4.44 MILL/UL (ref 4.2–5.6)
SODIUM SERPL-SCNC: 137 MMOL/L (ref 137–146)

## 2022-11-03 NOTE — NUR
PT RESTING IN HIGH FOWLERS POSITION. PT HX OF BRAIN TUMOR. PT IS ORIENTED TO
SELF. ABLE TO STATE NAME. HEART RHYTM NOTED. REPSIRATIONS UPPER AIR WAY
UNCLEAR. SUCTION TO BE PROVIDED IF PT UNABLE TO REMOVE PHELM. ALL SAFETY
PRECAUTIONS IN [place]CE CALL LIGHT IN REACH.

## 2022-11-03 NOTE — NUR
PATIENT ASSEMENT COMPLETED AT THIS TIME. PATIENT REQUESTING HER FLUIDS BE
THICKER BECAUSE SHE WAS TOLD THIS BY HER ONCOLOGIST.

## 2022-11-04 VITALS — SYSTOLIC BLOOD PRESSURE: 147 MMHG | DIASTOLIC BLOOD PRESSURE: 76 MMHG

## 2022-11-04 VITALS — SYSTOLIC BLOOD PRESSURE: 124 MMHG | DIASTOLIC BLOOD PRESSURE: 51 MMHG

## 2022-11-04 VITALS — SYSTOLIC BLOOD PRESSURE: 140 MMHG | DIASTOLIC BLOOD PRESSURE: 77 MMHG

## 2022-11-04 VITALS — SYSTOLIC BLOOD PRESSURE: 134 MMHG | DIASTOLIC BLOOD PRESSURE: 65 MMHG

## 2022-11-04 VITALS — DIASTOLIC BLOOD PRESSURE: 65 MMHG | SYSTOLIC BLOOD PRESSURE: 134 MMHG

## 2022-11-04 LAB
ALBUMIN SERPL-MCNC: 3.1 G/DL (ref 3.2–5)
ALP SERPL-CCNC: 70 U/L (ref 38–126)
ANION GAP SERPL CALCULATED.3IONS-SCNC: 13 MMOL/L
AST SERPL-CCNC: 17 U/L (ref 14–36)
BASOPHILS NFR BLD AUTO: 0 % (ref 0–3)
BILIRUB UR QL STRIP.AUTO: NEGATIVE
BUN SERPL-MCNC: 10 MG/DL (ref 7–17)
BUN/CREAT SERPL: 19
CHLORIDE SERPL-SCNC: 99 MMOL/L (ref 95–108)
CO2 SERPL-SCNC: 30 MMOL/L (ref 22–30)
COLOR UR AUTO: YELLOW
CREAT SERPL-MCNC: 0.5 MG/DL (ref 0.5–1)
EOSINOPHIL NFR BLD AUTO: 0 % (ref 0–8)
ERYTHROCYTE [DISTWIDTH] IN BLOOD BY AUTOMATED COUNT: 12.9 % (ref 11.5–15.5)
GLUCOSE UR STRIP.AUTO-MCNC: >=1000 MG/DL
HCT VFR BLD AUTO: 40.4 % (ref 37–47)
HGB BLD-MCNC: 13 G/DL (ref 12–16)
HGB UR QL STRIP.AUTO: (no result)
IMM GRANULOCYTES NFR BLD: 0.4 % (ref 0–5)
KETONES UR STRIP.AUTO-MCNC: 15 MG/DL
LEUKOCYTE ESTERASE UR QL STRIP.AUTO: NEGATIVE
LYMPHOCYTES NFR BLD: 25 % (ref 15–41)
MCH RBC QN AUTO: 28.4 PG  CALC (ref 26–32)
MCHC RBC AUTO-ENTMCNC: 32.2 G/DL CAL (ref 32–36)
MCV RBC AUTO: 88.4 FL  CALC (ref 80–100)
MONOCYTES NFR BLD AUTO: 13 % (ref 2–13)
NEUTROPHILS # BLD AUTO: 3.5 THOU/UL (ref 2–7.15)
NEUTROPHILS NFR BLD AUTO: 62 % (ref 42–76)
NITRITE UR QL STRIP.AUTO: NEGATIVE
PH UR STRIP.AUTO: 6.5 [PH] (ref 4.5–8)
PLATELET # BLD AUTO: 291 THOU/UL (ref 130–400)
POTASSIUM SERPL-SCNC: 4.4 MMOL/L (ref 3.5–5.1)
PROT SERPL-MCNC: 5.9 G/DL (ref 6.3–8.2)
PROT UR QL STRIP.AUTO: NEGATIVE MG/DL
RBC # BLD AUTO: 4.57 MILL/UL (ref 4.2–5.6)
RBC #/AREA URNS HPF: (no result) RBC/HPF (ref 0–5)
SODIUM SERPL-SCNC: 137 MMOL/L (ref 137–146)
SP GR UR STRIP.AUTO: 1.02
UROBILINOGEN UR QL STRIP.AUTO: 1 E.U./DL
WBC #/AREA URNS HPF: (no result) WBC/HPF (ref 0–5)
YEAST URNS QL MICRO: (no result) HPF

## 2022-11-04 NOTE — NUR
PATIENT RESTING IN BED PLEASANT AT THIS TIME. SAFETY AND FALL PRECAUTIONS IN
PLACE. CALL LIGHT WITHIN IN REACH.

## 2022-11-04 NOTE — NUR
PATIENT RESTING IN BED PLEASANT AT THIS TIME. MEDICATIONS DONE. SAFETY
AND FALL PRECAUTIONS IN PLACE. CALL LIGHT WITHIN IN REACH.

## 2022-11-04 NOTE — NUR
PATIENT CONFUSED HOLDING CALL LIGHT AND ASKING IF THIS IS HER CALL LIGHT.
REORIENTED PATIENT TO CALL LIGHT AND ITS USES. PATIENT VERBALIZES
UNDERSTADNING BUT CONTINUES TO USE CALL LIGHT AND ASK HOW TO USE THE CALL
LIGHT.

## 2022-11-04 NOTE — NUR
PATIENT ALERT AND ORIENTED X3. RESTING PLEASANT IN BED. ASSESSMENT HEAD-TP TOE
IS COMPLETE. PATIENT IS EDUCATED ABOUD MEDICATIONS AND NURSING PLAN FOR TODAY
PT REFER UNDERSTAND. SAFETY AND FALL PRECAUTIONS IN PLACE. CALL LIGHT WITHIN
IN REACH.

## 2022-11-05 VITALS — SYSTOLIC BLOOD PRESSURE: 132 MMHG | DIASTOLIC BLOOD PRESSURE: 71 MMHG

## 2022-11-05 VITALS — SYSTOLIC BLOOD PRESSURE: 124 MMHG | DIASTOLIC BLOOD PRESSURE: 60 MMHG

## 2022-11-05 VITALS — DIASTOLIC BLOOD PRESSURE: 71 MMHG | SYSTOLIC BLOOD PRESSURE: 132 MMHG

## 2022-11-05 VITALS — SYSTOLIC BLOOD PRESSURE: 124 MMHG | DIASTOLIC BLOOD PRESSURE: 51 MMHG

## 2022-11-05 LAB
ALBUMIN SERPL-MCNC: 3 G/DL (ref 3.2–5)
ALP SERPL-CCNC: 64 U/L (ref 38–126)
ANION GAP SERPL CALCULATED.3IONS-SCNC: 11 MMOL/L
AST SERPL-CCNC: 16 U/L (ref 14–36)
BASOPHILS NFR BLD AUTO: 0 % (ref 0–3)
BUN SERPL-MCNC: 15 MG/DL (ref 7–17)
BUN/CREAT SERPL: 25
CHLORIDE SERPL-SCNC: 102 MMOL/L (ref 95–108)
CO2 SERPL-SCNC: 31 MMOL/L (ref 22–30)
CREAT SERPL-MCNC: 0.6 MG/DL (ref 0.5–1)
CRP SERPL-MCNC: 2.7 MG/DL (ref 0–0.9)
EOSINOPHIL NFR BLD AUTO: 1 % (ref 0–8)
ERYTHROCYTE [DISTWIDTH] IN BLOOD BY AUTOMATED COUNT: 12.9 % (ref 11.5–15.5)
HCT VFR BLD AUTO: 41.8 % (ref 37–47)
HGB BLD-MCNC: 13.5 G/DL (ref 12–16)
IMM GRANULOCYTES NFR BLD: 0.6 % (ref 0–5)
LYMPHOCYTES NFR BLD: 34 % (ref 15–41)
MCH RBC QN AUTO: 28.8 PG  CALC (ref 26–32)
MCHC RBC AUTO-ENTMCNC: 32.3 G/DL CAL (ref 32–36)
MCV RBC AUTO: 89.3 FL  CALC (ref 80–100)
MONOCYTES NFR BLD AUTO: 8 % (ref 2–13)
NEUTROPHILS # BLD AUTO: 3.85 THOU/UL (ref 2–7.15)
NEUTROPHILS NFR BLD AUTO: 56 % (ref 42–76)
PLATELET # BLD AUTO: 267 THOU/UL (ref 130–400)
POTASSIUM SERPL-SCNC: 3.9 MMOL/L (ref 3.5–5.1)
PROT SERPL-MCNC: 5.7 G/DL (ref 6.3–8.2)
RBC # BLD AUTO: 4.68 MILL/UL (ref 4.2–5.6)
SODIUM SERPL-SCNC: 140 MMOL/L (ref 137–146)

## 2022-11-05 NOTE — NUR
Patient was asked to be changed, and cleaned up because patient was soiled.
Patient decline being cleaned nurse was notified.

## 2022-11-05 NOTE — NUR
PATIENT RESTING COMOFRTABLY. ANTIBIOTIC COMPLETED AT THIS TIME. PATIENT IN NO
APAPRENT DISTRESS. CALL LIGHT AND BEDSIDE TABLE WITHIN REACH.

## 2022-11-05 NOTE — NUR
PATIENT REFUSING TO BE CHNAGED AND WEIGHED AT THIS TIME. CNA ADVISED PATIENT
SHE WOULD LIEK TO CLEAN HER UP BECAUSE THE PUREWICK WAS NOT POSITIONED
CORRECTLY AND HER BED IS WET. PATIENT STATES "YOU DO WHAT I SAY AND IM NOT
GETTING CHANGED, YOU ARE NOT DOING ANYTHING TO ME." ATTEMPT BY OTHER CNA,
PATIENT ALLOWED THIS CNA, COURTNEY REYES TO CLEAN HER UP.
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
## 2022-11-05 NOTE — NUR
WENT IN TO CHANGE PATIENT AT THIS TIME AND GET WEIGHT. PATIENT REFUSED TO BE
CHANGED OR WEIGHED. EXPLAINED TO PATIENT THAT SHE COULDNT LAY IN THE BED
SOILED. PATIENT AGREED TO BE CHANGED AND WHEN TURNING THE PATIENT SHE STATES
"MY BUTT WASNT THIS RED WHEN I FIRST GOT HERE, WAIT UNTIL MY SON SEES
IT, YOU GUYS CAN BE FINED FOR THAT".  EXPLAINED TO PATIENT THE IMPORTANCE OF
BEING CHANGED WHEN SOILED. REPORTED TO NURSE.

## 2022-11-05 NOTE — NUR
PT IS IN BED WATCHING TV. PT IS REFUSING TO BE CHANGCE STATES SHE IS TO
COMFORTABLE. PT EDUCATED ON THE IMPORTACE TO BE CHAMGE. PUT CONTINUE STO
REFUSED BUT FINALLY AGRESS TO BE CHANGE. ASSESMENT COMPLETEDDENIES PAIN OR
DISCOMFORT.

## 2022-11-05 NOTE — NUR
PT RESTING ON LEFT SIDE A/O ASSESSMENT COMPLETED AS VS PT STATED SHE CAN NOT
BREATHE. PT EDUCATED TO  BREATHE THROUGH HER NOSE AND PUT HER MOUTH. INHALER
PROVIDED. PT STATED FEELING NAUSEASTED. ZOFRAN PROVIDED. PT DENIES ADDITIONAL
NEEDS AT THE TIME. PT TO BE MEDICATED PER EMAR. IV SITE NOTED. ALL SAFETY
PRECAUTIOSN IN PLACE WITH CALL LIGHT IN REACH.

## 2022-11-05 NOTE — NUR
PT WAS ASKED IF WE COULD CHANGE HER. SHE DECLINED. NURSE WAS NOTIFIED. 10MIN
LATER A NOTHER CNA AND I WENT BACK IN TO ASK AGAIN AND SHE SAID WE COULD
CHANGE HER, BUT SHE HAD THE RIGHT TO REFUSE, WE CHANGED HER THEN ASKED TO GET
HER WEIGHT ON THE MUSHTAQ SHE DECLINED AND AGAIN SAID SHE HAD THE RIGHT TO
REFUSE WE NOTIFIED THE NURSE AND  DOCUMENTED THE BED WEIGHT.

## 2022-11-05 NOTE — NUR
PT DENIES ADDITIONAL NEEDS OTHER THAN REPOSITIONING.ALL SAFETY PRECAUTIONS IN
PLACE CALL LIGHT IN REACH.

## 2022-11-05 NOTE — NUR
ALFA INSITENT ON BEING SUCCIONED STATES SHE HAS THROW UP IN HER THROAT.
WRITTER IN TO ASSHIGINIO, NO THROW U, PATIENT HAS  MUCUS IN HER THROAT, NOT ABLETO
REACH NAN MCGARRY. EDUCATED PATIENT ON IMPORTANCE OF COUGHING AND
DEEPBREATHING TO HELP CLEARHER THROAT. PATIENT STATES SHE AGREES BUT ASSOON
AS WRITTER LEAVES ROOM SHE CALLS AGAIN FOR SAME REASON. REINFOERMCEMENT
NEEDED.

## 2022-11-06 VITALS — DIASTOLIC BLOOD PRESSURE: 70 MMHG | SYSTOLIC BLOOD PRESSURE: 148 MMHG

## 2022-11-06 VITALS — SYSTOLIC BLOOD PRESSURE: 148 MMHG | DIASTOLIC BLOOD PRESSURE: 70 MMHG

## 2022-11-06 VITALS — SYSTOLIC BLOOD PRESSURE: 150 MMHG | DIASTOLIC BLOOD PRESSURE: 74 MMHG

## 2022-11-06 VITALS — DIASTOLIC BLOOD PRESSURE: 74 MMHG | SYSTOLIC BLOOD PRESSURE: 150 MMHG

## 2022-11-06 VITALS — SYSTOLIC BLOOD PRESSURE: 148 MMHG | DIASTOLIC BLOOD PRESSURE: 74 MMHG

## 2022-11-06 LAB
ALBUMIN SERPL-MCNC: 3.2 G/DL (ref 3.2–5)
ALP SERPL-CCNC: 64 U/L (ref 38–126)
ANION GAP SERPL CALCULATED.3IONS-SCNC: 10 MMOL/L
ANION GAP SERPL CALCULATED.3IONS-SCNC: 10 MMOL/L
AST SERPL-CCNC: 15 U/L (ref 14–36)
BASOPHILS NFR BLD AUTO: 0 % (ref 0–3)
BASOPHILS NFR BLD AUTO: 0 % (ref 0–3)
BUN SERPL-MCNC: 11 MG/DL (ref 7–17)
BUN SERPL-MCNC: 12 MG/DL (ref 7–17)
BUN/CREAT SERPL: 19
BUN/CREAT SERPL: 22
CHLORIDE SERPL-SCNC: 100 MMOL/L (ref 95–108)
CHLORIDE SERPL-SCNC: 98 MMOL/L (ref 95–108)
CO2 SERPL-SCNC: 31 MMOL/L (ref 22–30)
CO2 SERPL-SCNC: 35 MMOL/L (ref 22–30)
CREAT SERPL-MCNC: 0.5 MG/DL (ref 0.5–1)
CREAT SERPL-MCNC: 0.6 MG/DL (ref 0.5–1)
EOSINOPHIL NFR BLD AUTO: 0 % (ref 0–8)
EOSINOPHIL NFR BLD AUTO: 1 % (ref 0–8)
ERYTHROCYTE [DISTWIDTH] IN BLOOD BY AUTOMATED COUNT: 12.6 % (ref 11.5–15.5)
ERYTHROCYTE [DISTWIDTH] IN BLOOD BY AUTOMATED COUNT: 12.6 % (ref 11.5–15.5)
HCT VFR BLD AUTO: 41.2 % (ref 37–47)
HCT VFR BLD AUTO: 43.2 % (ref 37–47)
HGB BLD-MCNC: 13.2 G/DL (ref 12–16)
HGB BLD-MCNC: 13.9 G/DL (ref 12–16)
IMM GRANULOCYTES NFR BLD: 0.3 % (ref 0–5)
IMM GRANULOCYTES NFR BLD: 0.3 % (ref 0–5)
LYMPHOCYTES NFR BLD: 31 % (ref 15–41)
LYMPHOCYTES NFR BLD: 32 % (ref 15–41)
MCH RBC QN AUTO: 28.4 PG  CALC (ref 26–32)
MCH RBC QN AUTO: 28.8 PG  CALC (ref 26–32)
MCHC RBC AUTO-ENTMCNC: 32 G/DL CAL (ref 32–36)
MCHC RBC AUTO-ENTMCNC: 32.2 G/DL CAL (ref 32–36)
MCV RBC AUTO: 88.6 FL  CALC (ref 80–100)
MCV RBC AUTO: 89.4 FL  CALC (ref 80–100)
MONOCYTES NFR BLD AUTO: 7 % (ref 2–13)
MONOCYTES NFR BLD AUTO: 7 % (ref 2–13)
NEUTROPHILS # BLD AUTO: 4.15 THOU/UL (ref 2–7.15)
NEUTROPHILS # BLD AUTO: 4.54 THOU/UL (ref 2–7.15)
NEUTROPHILS NFR BLD AUTO: 60 % (ref 42–76)
NEUTROPHILS NFR BLD AUTO: 61 % (ref 42–76)
PLATELET # BLD AUTO: 293 THOU/UL (ref 130–400)
PLATELET # BLD AUTO: 311 THOU/UL (ref 130–400)
POTASSIUM SERPL-SCNC: 3.2 MMOL/L (ref 3.5–5.1)
POTASSIUM SERPL-SCNC: 3.6 MMOL/L (ref 3.5–5.1)
PROT SERPL-MCNC: 6.1 G/DL (ref 6.3–8.2)
RBC # BLD AUTO: 4.65 MILL/UL (ref 4.2–5.6)
RBC # BLD AUTO: 4.83 MILL/UL (ref 4.2–5.6)
SODIUM SERPL-SCNC: 138 MMOL/L (ref 137–146)
SODIUM SERPL-SCNC: 140 MMOL/L (ref 137–146)

## 2022-11-06 NOTE — NUR
RECIVED REPORT. PT IN BED, HIGH FOWELERS POSITION. NO SIGNS OF RESPIRATORY
DISTRESS, NON LABORED BREATHING. IV SITE PATENT. PT DENIES ANY NEEDS AT THIS
TIME. ALL SAEFTY PREFAUTIONS IN PLACE AT THIS TIME.

## 2022-11-06 NOTE — NUR
PT IN BED RESTING WITH EYES CLLSED BREATINHG EVEN AND ALABORED. NO S/S OF
DISTRESS NOTED. CALL LIGHT IN REACH AND BED IN LOWEST POSITION

## 2022-11-06 NOTE — NUR
PT IS REFUSING TO BE CHANGE STATES IF I MOVE I AM SCARED RO VOMIT. PT EDUCATED
ON THE INPORTANCE OF CHANGING. BUT CONTINUES TO REFUSED. PT MEDICATED FOR
NAUSEA AND A HEADACHE. CALL LIGHT IN REACH AND BED IN LOWEST POSITION

## 2022-11-06 NOTE — NUR
Patient refused when asked to be change, patient is soiled. Patient refused to
be weighted when asked. Bed scale 99.3 kg.

## 2022-11-06 NOTE — NUR
PT RESTING IN SEMI FOWLERS POSITION. A/OX3 ASSESSMENT AND VS COMPLETED. HEART
RHYTHM NORMAL. RESPIRATIONS UNALBORED PT PRN FOR OXYGEN. PT STATED FEELING SOB
. INHALER PROVIDED. IV SITE TO LEFT HAND NOTED. S.L PT DENIES ADDITIONAL NEEDS
AT THE TIME ALL SAFETY PRECAUTIONS IN PLACE CALL LIGHT INREACH.

## 2022-11-06 NOTE — NUR
PT RESTING IN HIGH FOWLERS POSITION. PT DENIED ADDITIONAL NEEDS AT THE TIME
ALL SAFETY PRECAUTIONS IN PLACE CALL LIGHT INREACH.

## 2022-11-07 VITALS — DIASTOLIC BLOOD PRESSURE: 62 MMHG | SYSTOLIC BLOOD PRESSURE: 135 MMHG

## 2022-11-07 VITALS — SYSTOLIC BLOOD PRESSURE: 135 MMHG | DIASTOLIC BLOOD PRESSURE: 62 MMHG

## 2022-11-07 VITALS — DIASTOLIC BLOOD PRESSURE: 69 MMHG | SYSTOLIC BLOOD PRESSURE: 130 MMHG

## 2022-11-07 NOTE — NUR
RECIEVED REPORT ON PT. PT IN BED WATCHING TV, BREATHING EVEN AND NON LABORED.
IV SITE PATENT, SL. PT DENIES ANY PAIN OR DISCOMFORT. REQUESTED PRUNE JUICE
AND WARM BLANKET. SPEECH GARBLED BUT UNDERSTANDABLE. PT DENIES ANY FURTHER
NEEDS

## 2022-11-07 NOTE — NUR
PT IN BED WATCHIN GTV. BREATHING REMAINS THE SAME. PT DENIES ANY NEEDS AT THIS
TIME. ALL SAFETY PRECAUTIONS IN PLACE AT THIS TIMEN

## 2022-11-07 NOTE — NUR
PT RESTING IN BED, REPOSTIONED. PT HAS LEFT SIDED WEAKNESS.UPDATED PT ON
CURRENT PLAN OF CARE. ASSESSMENT PERFORMED. IV PATENT. ABLE TO TOLERATE PO
MEDICATION. FALL/SAFTEY PRECAUTION IN PLACE. CALL LIGHT WITHIN REACH.

## 2022-11-07 NOTE — NUR
REPOSTIONED PERFORMED. O2 IN PLACE VIA NC @3L. STATES HEADACHE PAIN IS LESS
2-10
FALL/SAFTEY PRECAUTION IN PLACE. CALL LIGHT WITHIN REACH

## 2022-11-07 NOTE — NUR
PT RESTING IN BED, GIVEN TYLENOL FOR HEADACHE, PT REPORTED IMPROVEMENT. PT
DENIES ANY NEEDS AT THIS TIME. BREATHING REMAINS THE SAME. IV SITE PATENT. ALL
SAFETY AND ISOLATION PRECAUTIONS IN PLACE AT THIS TIME

## 2022-11-07 NOTE — NUR
PT RESTING IN BED, REPOSTIONED. STATES NO NEEDS AT THIS TIME. FALL/SAFTEY
PRECAUTION IN PLACED. CALL LIGHT WITHIN REACH

## 2022-11-08 VITALS — DIASTOLIC BLOOD PRESSURE: 64 MMHG | SYSTOLIC BLOOD PRESSURE: 149 MMHG

## 2022-11-08 VITALS — SYSTOLIC BLOOD PRESSURE: 151 MMHG | DIASTOLIC BLOOD PRESSURE: 73 MMHG

## 2022-11-08 VITALS — SYSTOLIC BLOOD PRESSURE: 132 MMHG | DIASTOLIC BLOOD PRESSURE: 62 MMHG

## 2022-11-08 VITALS — DIASTOLIC BLOOD PRESSURE: 69 MMHG | SYSTOLIC BLOOD PRESSURE: 140 MMHG

## 2022-11-08 LAB
ANION GAP SERPL CALCULATED.3IONS-SCNC: 11 MMOL/L
BUN SERPL-MCNC: 14 MG/DL (ref 7–17)
BUN/CREAT SERPL: 26
CHLORIDE SERPL-SCNC: 100 MMOL/L (ref 95–108)
CO2 SERPL-SCNC: 31 MMOL/L (ref 22–30)
CREAT SERPL-MCNC: 0.5 MG/DL (ref 0.5–1)
MAGNESIUM SERPL-MCNC: 2 MG/DL (ref 1.6–2.3)
POTASSIUM SERPL-SCNC: 4.1 MMOL/L (ref 3.5–5.1)
SODIUM SERPL-SCNC: 138 MMOL/L (ref 137–146)

## 2022-11-08 NOTE — NUR
PT IN BED WATCHING TV AND PLAYING GAMES ON PHONE. BREATHING REMAINS EVEN AND
NON LABORED. PT DENIES ANY NEEDS AT THIS TIME.

## 2022-11-08 NOTE — NUR
RECIEVED REPORT, PT IN BED HIGH FOWELERS POSITION. O2 IN PLACE, 1.5L. PT HAS
MILD COUGH.C/O CONSTIPATION WILL MEDICATE WITH 2100 MEDS. DENIES PAIN OR
DISCOMFORT. ALL SAFETY PRECAUTIONS IN PLACE AT THIS TIME

## 2022-11-08 NOTE — NUR
PT RESTING IN BED; ASSESSMENT COMPLETED; PT MEDICATED PER MAR; ADIVISED OF
POC; PT DENIES ANY NEEDS AT THIS TIME; CALL LIGHT WITHIN REACH

## 2022-11-08 NOTE — NUR
PT SLEEPING IN HIGH FOWLERS POSITION, EASILY AROUSIBLE. A/I X3. BREATHING
SLIGHTLY LABORED. PT DENIES PAIN, N/V OR SHORTNESS OF BREATH. IV SITE PATENT,
SL. DENIES ANY FURTHER NEEDS. ALL SAFETY PRECAUTIONS IN PLACE AT THIS TIME

## 2022-11-09 VITALS — SYSTOLIC BLOOD PRESSURE: 148 MMHG | DIASTOLIC BLOOD PRESSURE: 68 MMHG

## 2022-11-09 VITALS — SYSTOLIC BLOOD PRESSURE: 132 MMHG | DIASTOLIC BLOOD PRESSURE: 70 MMHG

## 2022-11-09 VITALS — SYSTOLIC BLOOD PRESSURE: 140 MMHG | DIASTOLIC BLOOD PRESSURE: 75 MMHG

## 2022-11-09 VITALS — SYSTOLIC BLOOD PRESSURE: 135 MMHG | DIASTOLIC BLOOD PRESSURE: 70 MMHG

## 2022-11-09 VITALS — DIASTOLIC BLOOD PRESSURE: 70 MMHG | SYSTOLIC BLOOD PRESSURE: 132 MMHG

## 2022-11-09 NOTE — NUR
PATIENT TO BE D/C HOME VIA [facility]. [facility] CONTACTED AND WILL BE
TRANSPORTING AROUND 2230.

## 2022-11-09 NOTE — NUR
PT IN BED, BREATHING REMAINS THE SAME. PT DENIES ANY PAIN OR DISCOMFORT. NO
BOWEL MOVEMENT AT THIS TIME. PT RECIEVING IV ANTIBIODICS AT THIS TIME. ALL
SAFETY PRECAUTIONS IN PLACE AT THIS TIME

## 2022-11-09 NOTE — NUR
PT RESTING IN BED, STATES NO PAIN. ABLE TO OBEY COMMANDS. ASSESSMENT
PERFORMED. REORIENTATED PT TO ROOM AND CALL LIGHT SYSTEM. FALL/SAFTEY
PRECAUIOTN IN PLACE, CALL LIGHT WITHIN REACH.

## 2022-11-09 NOTE — NUR
PT REMAINS IN HIGH FOWELERS POSITION, REFUSES TO BE REPOSITIONED. BREATHING
EVEN AND NON LABORED. DENIES PAIN OR DISCOMFORT. NO BOWEL MOVEMENT AT THIS
TIME. PT DENIES ANY NEEDS. ALL SAFETY AND ISOLATION PRECAUTIONS IN PLACE AT
THIS TIME

## 2022-11-09 NOTE — NUR
PT RESTING IN BED AWAITING TO GO HOME.
STATES NO NEEDS AT THIS TIME. FALL/SAFTEY PRECAUITON IN PLACE. CALL LIGHT
WITHIN REACH.

## 2022-11-09 NOTE — NUR
PT RESTING IN BED, MOUTH CARE PROVIDED. NO DISTRESS NOTED. FALL/SAFTEY
PRECAUTION IN PLACE. CALL LIGHT WITHIN REACH

## 2022-11-09 NOTE — NUR
RECEIVED REPORT FROM ELISA LAMBERT. PT ON ON BED HIGH FOWLERS: A&O X3.
ASSESSMENT COMPLETED. O2 @ 3L VIA NASAL CANNULA IN PLACE. PUREWICK IN PLACE.
NO DISTRESS OR PAIN NOTED. PT AWAITING TRANSPORTATION TO GO HOME. SAFETY
PRECAUTIONS IN PLACE WITH CALL LIGHT IN REACH.

## 2022-11-09 NOTE — NUR
PT EDUCATED ON DC INSTRUCTIONS. IV REMOVED: # 22 LEFT FA, CATHETER INTACT UPON
REMOVAL, PT TOLERATED WELL. PT LEFT @ 2250 [facility]IA [facility].
Discharge instructions given. Patient verbalizes understanding of same.
Discharged in stable condition via [facility] Transport to Home with
*Other. All belongings sent with pt.

## 2022-11-12 ENCOUNTER — HOSPITAL ENCOUNTER (EMERGENCY)
Dept: HOSPITAL 82 - ED | Age: 57
LOS: 1 days | Discharge: HOME | End: 2022-11-13
Payer: MEDICARE

## 2022-11-12 VITALS — SYSTOLIC BLOOD PRESSURE: 93 MMHG | DIASTOLIC BLOOD PRESSURE: 49 MMHG

## 2022-11-12 VITALS — SYSTOLIC BLOOD PRESSURE: 183 MMHG | DIASTOLIC BLOOD PRESSURE: 66 MMHG

## 2022-11-12 VITALS — DIASTOLIC BLOOD PRESSURE: 46 MMHG | SYSTOLIC BLOOD PRESSURE: 116 MMHG

## 2022-11-12 VITALS — DIASTOLIC BLOOD PRESSURE: 46 MMHG | SYSTOLIC BLOOD PRESSURE: 108 MMHG

## 2022-11-12 VITALS — WEIGHT: 218.26 LBS | BODY MASS INDEX: 40.16 KG/M2 | HEIGHT: 62 IN

## 2022-11-12 DIAGNOSIS — E11.9: ICD-10-CM

## 2022-11-12 DIAGNOSIS — G82.20: ICD-10-CM

## 2022-11-12 DIAGNOSIS — Z79.4: ICD-10-CM

## 2022-11-12 DIAGNOSIS — E66.01: ICD-10-CM

## 2022-11-12 DIAGNOSIS — F41.9: ICD-10-CM

## 2022-11-12 DIAGNOSIS — I10: ICD-10-CM

## 2022-11-12 DIAGNOSIS — R21: Primary | ICD-10-CM

## 2022-11-12 LAB
BASOPHILS NFR BLD AUTO: 0 % (ref 0–3)
EOSINOPHIL NFR BLD AUTO: 0 % (ref 0–8)
ERYTHROCYTE [DISTWIDTH] IN BLOOD BY AUTOMATED COUNT: 12.2 % (ref 11.5–15.5)
HCT VFR BLD AUTO: 42.8 % (ref 37–47)
HGB BLD-MCNC: 13.9 G/DL (ref 12–16)
IMM GRANULOCYTES NFR BLD: 0.2 % (ref 0–5)
LYMPHOCYTES NFR BLD: 10 % (ref 15–41)
MCH RBC QN AUTO: 28.8 PG  CALC (ref 26–32)
MCHC RBC AUTO-ENTMCNC: 32.5 G/DL CAL (ref 32–36)
MCV RBC AUTO: 88.6 FL  CALC (ref 80–100)
MONOCYTES NFR BLD AUTO: 5 % (ref 2–13)
NEUTROPHILS # BLD AUTO: 10.39 THOU/UL (ref 2–7.15)
NEUTROPHILS NFR BLD AUTO: 85 % (ref 42–76)
PLATELET # BLD AUTO: 270 THOU/UL (ref 130–400)
RBC # BLD AUTO: 4.83 MILL/UL (ref 4.2–5.6)

## 2022-11-13 VITALS — SYSTOLIC BLOOD PRESSURE: 183 MMHG | DIASTOLIC BLOOD PRESSURE: 66 MMHG

## 2022-11-13 LAB
ALBUMIN SERPL-MCNC: 3.7 G/DL (ref 3.2–5)
ALP SERPL-CCNC: 90 U/L (ref 38–126)
ANION GAP SERPL CALCULATED.3IONS-SCNC: 9 MMOL/L
AST SERPL-CCNC: 17 U/L (ref 14–36)
BUN SERPL-MCNC: 9 MG/DL (ref 7–17)
BUN/CREAT SERPL: 15
CHLORIDE SERPL-SCNC: 98 MMOL/L (ref 95–108)
CO2 SERPL-SCNC: 32 MMOL/L (ref 22–30)
CREAT SERPL-MCNC: 0.6 MG/DL (ref 0.5–1)
POTASSIUM SERPL-SCNC: 3.6 MMOL/L (ref 3.5–5.1)
PROT SERPL-MCNC: 7.1 G/DL (ref 6.3–8.2)
SODIUM SERPL-SCNC: 135 MMOL/L (ref 137–146)

## 2023-07-07 ENCOUNTER — HOSPITAL ENCOUNTER (INPATIENT)
Dept: HOSPITAL 82 - ED | Age: 58
LOS: 4 days | Discharge: NURSING, REHAB OR LONG-TERM CARE | DRG: 690 | End: 2023-07-11
Attending: INTERNAL MEDICINE | Admitting: INTERNAL MEDICINE
Payer: MEDICARE

## 2023-07-07 VITALS — DIASTOLIC BLOOD PRESSURE: 83 MMHG | SYSTOLIC BLOOD PRESSURE: 162 MMHG

## 2023-07-07 VITALS — SYSTOLIC BLOOD PRESSURE: 150 MMHG | DIASTOLIC BLOOD PRESSURE: 67 MMHG

## 2023-07-07 VITALS — DIASTOLIC BLOOD PRESSURE: 72 MMHG | SYSTOLIC BLOOD PRESSURE: 155 MMHG

## 2023-07-07 VITALS — SYSTOLIC BLOOD PRESSURE: 140 MMHG | DIASTOLIC BLOOD PRESSURE: 65 MMHG

## 2023-07-07 VITALS — DIASTOLIC BLOOD PRESSURE: 67 MMHG | SYSTOLIC BLOOD PRESSURE: 150 MMHG

## 2023-07-07 VITALS — DIASTOLIC BLOOD PRESSURE: 76 MMHG | SYSTOLIC BLOOD PRESSURE: 167 MMHG

## 2023-07-07 VITALS — DIASTOLIC BLOOD PRESSURE: 64 MMHG | SYSTOLIC BLOOD PRESSURE: 134 MMHG

## 2023-07-07 VITALS — SYSTOLIC BLOOD PRESSURE: 134 MMHG | DIASTOLIC BLOOD PRESSURE: 64 MMHG

## 2023-07-07 VITALS — DIASTOLIC BLOOD PRESSURE: 70 MMHG | SYSTOLIC BLOOD PRESSURE: 146 MMHG

## 2023-07-07 VITALS — DIASTOLIC BLOOD PRESSURE: 78 MMHG | SYSTOLIC BLOOD PRESSURE: 140 MMHG

## 2023-07-07 VITALS — SYSTOLIC BLOOD PRESSURE: 156 MMHG | DIASTOLIC BLOOD PRESSURE: 77 MMHG

## 2023-07-07 VITALS — HEIGHT: 62 IN | WEIGHT: 229.28 LBS | BODY MASS INDEX: 42.19 KG/M2

## 2023-07-07 VITALS — DIASTOLIC BLOOD PRESSURE: 53 MMHG | SYSTOLIC BLOOD PRESSURE: 131 MMHG

## 2023-07-07 VITALS — SYSTOLIC BLOOD PRESSURE: 147 MMHG | DIASTOLIC BLOOD PRESSURE: 92 MMHG

## 2023-07-07 VITALS — DIASTOLIC BLOOD PRESSURE: 67 MMHG | SYSTOLIC BLOOD PRESSURE: 142 MMHG

## 2023-07-07 DIAGNOSIS — J96.11: ICD-10-CM

## 2023-07-07 DIAGNOSIS — E11.9: ICD-10-CM

## 2023-07-07 DIAGNOSIS — F41.9: ICD-10-CM

## 2023-07-07 DIAGNOSIS — Z74.01: ICD-10-CM

## 2023-07-07 DIAGNOSIS — Z74.2: ICD-10-CM

## 2023-07-07 DIAGNOSIS — Z20.822: ICD-10-CM

## 2023-07-07 DIAGNOSIS — G40.909: ICD-10-CM

## 2023-07-07 DIAGNOSIS — E66.01: ICD-10-CM

## 2023-07-07 DIAGNOSIS — I10: ICD-10-CM

## 2023-07-07 DIAGNOSIS — N39.0: Primary | ICD-10-CM

## 2023-07-07 DIAGNOSIS — Z86.73: ICD-10-CM

## 2023-07-07 DIAGNOSIS — B96.89: ICD-10-CM

## 2023-07-07 DIAGNOSIS — R33.9: ICD-10-CM

## 2023-07-07 DIAGNOSIS — Z79.4: ICD-10-CM

## 2023-07-07 DIAGNOSIS — C71.9: ICD-10-CM

## 2023-07-07 DIAGNOSIS — R62.7: ICD-10-CM

## 2023-07-07 DIAGNOSIS — Z99.81: ICD-10-CM

## 2023-07-07 DIAGNOSIS — G81.90: ICD-10-CM

## 2023-07-07 DIAGNOSIS — Z59.1: ICD-10-CM

## 2023-07-07 LAB
ALBUMIN SERPL-MCNC: 3.8 G/DL (ref 3.2–5)
ALP SERPL-CCNC: 75 U/L (ref 38–126)
ANION GAP SERPL CALCULATED.3IONS-SCNC: 9 MMOL/L
AST SERPL-CCNC: 19 U/L (ref 14–36)
BACTERIA #/AREA URNS HPF: (no result) HPF
BASOPHILS NFR BLD AUTO: 0.3 % (ref 0–3)
BILIRUB UR QL STRIP.AUTO: NEGATIVE
BUN SERPL-MCNC: 10 MG/DL (ref 7–17)
BUN/CREAT SERPL: 15
CHLORIDE SERPL-SCNC: 102 MMOL/L (ref 95–108)
CO2 SERPL-SCNC: 33 MMOL/L (ref 22–30)
COLOR UR AUTO: YELLOW
CREAT SERPL-MCNC: 0.6 MG/DL (ref 0.5–1)
EOSINOPHIL NFR BLD AUTO: 1.6 % (ref 0–8)
ERYTHROCYTE [DISTWIDTH] IN BLOOD BY AUTOMATED COUNT: 12.6 % (ref 11.5–15.5)
GLUCOSE UR STRIP.AUTO-MCNC: NEGATIVE MG/DL
HCT VFR BLD AUTO: 44.4 % (ref 37–47)
HGB BLD-MCNC: 13.8 G/DL (ref 12–16)
HGB UR QL STRIP.AUTO: NEGATIVE
IMM GRANULOCYTES NFR BLD: 0.1 % (ref 0–5)
KETONES UR STRIP.AUTO-MCNC: NEGATIVE MG/DL
LEUKOCYTE ESTERASE UR QL STRIP.AUTO: (no result)
LYMPHOCYTES NFR BLD: 30.8 % (ref 15–41)
MCH RBC QN AUTO: 27.7 PG  CALC (ref 26–32)
MCHC RBC AUTO-ENTMCNC: 31.1 G/DL CAL (ref 32–36)
MCV RBC AUTO: 89.2 FL  CALC (ref 80–100)
MONOCYTES NFR BLD AUTO: 5.4 % (ref 2–13)
NEUTROPHILS # BLD AUTO: 4.34 THOU/UL (ref 2–7.15)
NEUTROPHILS NFR BLD AUTO: 61.8 % (ref 42–76)
NITRITE UR QL STRIP.AUTO: NEGATIVE
PH UR STRIP.AUTO: 6.5 [PH] (ref 4.5–8)
PLATELET # BLD AUTO: 254 THOU/UL (ref 130–400)
POTASSIUM SERPL-SCNC: 3.2 MMOL/L (ref 3.5–5.1)
PROT SERPL-MCNC: 7.4 G/DL (ref 6.3–8.2)
PROT UR QL STRIP.AUTO: NEGATIVE MG/DL
RBC # BLD AUTO: 4.98 MILL/UL (ref 4.2–5.6)
RBC #/AREA URNS HPF: (no result) RBC/HPF (ref 0–5)
SODIUM SERPL-SCNC: 140 MMOL/L (ref 137–146)
SP GR UR STRIP.AUTO: 1.01
UROBILINOGEN UR QL STRIP.AUTO: 0.2 E.U./DL
WBC #/AREA URNS HPF: (no result) WBC/HPF (ref 0–5)

## 2023-07-07 PROCEDURE — 0T9B70Z DRAINAGE OF BLADDER WITH DRAINAGE DEVICE, VIA NATURAL OR ARTIFICIAL OPENING: ICD-10-PCS | Performed by: FAMILY MEDICINE

## 2023-07-07 SDOH — ECONOMIC STABILITY - HOUSING INSECURITY: INADEQUATE HOUSING: Z59.1

## 2023-07-08 VITALS — DIASTOLIC BLOOD PRESSURE: 72 MMHG | SYSTOLIC BLOOD PRESSURE: 147 MMHG

## 2023-07-08 VITALS — DIASTOLIC BLOOD PRESSURE: 54 MMHG | SYSTOLIC BLOOD PRESSURE: 131 MMHG

## 2023-07-08 VITALS — SYSTOLIC BLOOD PRESSURE: 129 MMHG | DIASTOLIC BLOOD PRESSURE: 63 MMHG

## 2023-07-08 VITALS — DIASTOLIC BLOOD PRESSURE: 64 MMHG | SYSTOLIC BLOOD PRESSURE: 141 MMHG

## 2023-07-08 VITALS — DIASTOLIC BLOOD PRESSURE: 69 MMHG | SYSTOLIC BLOOD PRESSURE: 135 MMHG

## 2023-07-08 VITALS — SYSTOLIC BLOOD PRESSURE: 147 MMHG | DIASTOLIC BLOOD PRESSURE: 72 MMHG

## 2023-07-08 VITALS — SYSTOLIC BLOOD PRESSURE: 141 MMHG | DIASTOLIC BLOOD PRESSURE: 64 MMHG

## 2023-07-08 LAB
ALBUMIN SERPL-MCNC: 3.6 G/DL (ref 3.2–5)
ALP SERPL-CCNC: 61 U/L (ref 38–126)
ANION GAP SERPL CALCULATED.3IONS-SCNC: 11 MMOL/L
AST SERPL-CCNC: 17 U/L (ref 14–36)
BASOPHILS NFR BLD AUTO: 0.2 % (ref 0–3)
BUN SERPL-MCNC: 16 MG/DL (ref 7–17)
BUN/CREAT SERPL: 24
CHLORIDE SERPL-SCNC: 104 MMOL/L (ref 95–108)
CHOLEST SERPL-MCNC: 228 MG/DL (ref 0–199)
CHOLEST/HDLC SERPL: 5.5 {RATIO}
CO2 SERPL-SCNC: 28 MMOL/L (ref 22–30)
CREAT SERPL-MCNC: 0.7 MG/DL (ref 0.5–1)
EOSINOPHIL NFR BLD AUTO: 0.2 % (ref 0–8)
ERYTHROCYTE [DISTWIDTH] IN BLOOD BY AUTOMATED COUNT: 12.6 % (ref 11.5–15.5)
HCT VFR BLD AUTO: 39.5 % (ref 37–47)
HDLC SERPL-MCNC: 41 MG/DL (ref 39–59)
HGB BLD-MCNC: 12.7 G/DL (ref 12–16)
IMM GRANULOCYTES NFR BLD: 0.1 % (ref 0–5)
LDLC SERPL CALC-MCNC: 158 MG/DL
LYMPHOCYTES NFR BLD: 23.1 % (ref 15–41)
MAGNESIUM SERPL-MCNC: 1.9 MG/DL (ref 1.6–2.3)
MCH RBC QN AUTO: 28.6 PG  CALC (ref 26–32)
MCHC RBC AUTO-ENTMCNC: 32.2 G/DL CAL (ref 32–36)
MCV RBC AUTO: 89 FL  CALC (ref 80–100)
MONOCYTES NFR BLD AUTO: 6.5 % (ref 2–13)
NEUTROPHILS # BLD AUTO: 5.82 THOU/UL (ref 2–7.15)
NEUTROPHILS NFR BLD AUTO: 69.9 % (ref 42–76)
PLATELET # BLD AUTO: 272 THOU/UL (ref 130–400)
POTASSIUM SERPL-SCNC: 4.1 MMOL/L (ref 3.5–5.1)
PROT SERPL-MCNC: 6.6 G/DL (ref 6.3–8.2)
RBC # BLD AUTO: 4.44 MILL/UL (ref 4.2–5.6)
SODIUM SERPL-SCNC: 139 MMOL/L (ref 137–146)
TRIGL SERPL-MCNC: 145 MG/DL (ref 0–149)
VLDLC SERPL CALC-MCNC: 29 MG/DL

## 2023-07-09 VITALS — SYSTOLIC BLOOD PRESSURE: 164 MMHG | DIASTOLIC BLOOD PRESSURE: 78 MMHG

## 2023-07-09 VITALS — DIASTOLIC BLOOD PRESSURE: 62 MMHG | SYSTOLIC BLOOD PRESSURE: 149 MMHG

## 2023-07-09 VITALS — DIASTOLIC BLOOD PRESSURE: 91 MMHG | SYSTOLIC BLOOD PRESSURE: 154 MMHG

## 2023-07-09 VITALS — SYSTOLIC BLOOD PRESSURE: 158 MMHG | DIASTOLIC BLOOD PRESSURE: 75 MMHG

## 2023-07-09 VITALS — SYSTOLIC BLOOD PRESSURE: 145 MMHG | DIASTOLIC BLOOD PRESSURE: 78 MMHG

## 2023-07-09 VITALS — DIASTOLIC BLOOD PRESSURE: 66 MMHG | SYSTOLIC BLOOD PRESSURE: 140 MMHG

## 2023-07-09 VITALS — SYSTOLIC BLOOD PRESSURE: 154 MMHG | DIASTOLIC BLOOD PRESSURE: 91 MMHG

## 2023-07-10 VITALS — DIASTOLIC BLOOD PRESSURE: 84 MMHG | SYSTOLIC BLOOD PRESSURE: 163 MMHG

## 2023-07-10 VITALS — SYSTOLIC BLOOD PRESSURE: 150 MMHG | DIASTOLIC BLOOD PRESSURE: 81 MMHG

## 2023-07-10 VITALS — SYSTOLIC BLOOD PRESSURE: 159 MMHG | DIASTOLIC BLOOD PRESSURE: 71 MMHG

## 2023-07-11 VITALS — SYSTOLIC BLOOD PRESSURE: 155 MMHG | DIASTOLIC BLOOD PRESSURE: 68 MMHG

## 2023-07-11 VITALS — DIASTOLIC BLOOD PRESSURE: 70 MMHG | SYSTOLIC BLOOD PRESSURE: 151 MMHG

## 2023-07-11 VITALS — DIASTOLIC BLOOD PRESSURE: 79 MMHG | SYSTOLIC BLOOD PRESSURE: 168 MMHG

## 2023-07-11 LAB
ALBUMIN SERPL-MCNC: 3.5 G/DL (ref 3.2–5)
ALP SERPL-CCNC: 66 U/L (ref 38–126)
ANION GAP SERPL CALCULATED.3IONS-SCNC: 10 MMOL/L
AST SERPL-CCNC: 21 U/L (ref 14–36)
BASOPHILS NFR BLD AUTO: 0.5 % (ref 0–3)
BUN SERPL-MCNC: 15 MG/DL (ref 7–17)
BUN/CREAT SERPL: 25
CHLORIDE SERPL-SCNC: 102 MMOL/L (ref 95–108)
CO2 SERPL-SCNC: 32 MMOL/L (ref 22–30)
CREAT SERPL-MCNC: 0.6 MG/DL (ref 0.5–1)
EOSINOPHIL NFR BLD AUTO: 2.4 % (ref 0–8)
ERYTHROCYTE [DISTWIDTH] IN BLOOD BY AUTOMATED COUNT: 12.4 % (ref 11.5–15.5)
HCT VFR BLD AUTO: 42.3 % (ref 37–47)
HGB BLD-MCNC: 13.3 G/DL (ref 12–16)
IMM GRANULOCYTES NFR BLD: 0 % (ref 0–5)
LYMPHOCYTES NFR BLD: 26.4 % (ref 15–41)
MCH RBC QN AUTO: 28.4 PG  CALC (ref 26–32)
MCHC RBC AUTO-ENTMCNC: 31.4 G/DL CAL (ref 32–36)
MCV RBC AUTO: 90.4 FL  CALC (ref 80–100)
MONOCYTES NFR BLD AUTO: 5.5 % (ref 2–13)
NEUTROPHILS # BLD AUTO: 3.78 THOU/UL (ref 2–7.15)
NEUTROPHILS NFR BLD AUTO: 65.2 % (ref 42–76)
PLATELET # BLD AUTO: 243 THOU/UL (ref 130–400)
POTASSIUM SERPL-SCNC: 4.4 MMOL/L (ref 3.5–5.1)
PROT SERPL-MCNC: 6.7 G/DL (ref 6.3–8.2)
RBC # BLD AUTO: 4.68 MILL/UL (ref 4.2–5.6)
SODIUM SERPL-SCNC: 139 MMOL/L (ref 137–146)